# Patient Record
Sex: MALE | Race: WHITE | URBAN - METROPOLITAN AREA
[De-identification: names, ages, dates, MRNs, and addresses within clinical notes are randomized per-mention and may not be internally consistent; named-entity substitution may affect disease eponyms.]

---

## 2017-04-07 ENCOUNTER — HOSPITAL ENCOUNTER (OUTPATIENT)
Age: 52
Setting detail: SPECIMEN
Discharge: HOME | End: 2017-04-07
Payer: MEDICARE

## 2017-04-07 LAB
-: ABNORMAL
AMORPHOUS: ABNORMAL
BACTERIA: ABNORMAL
BILIRUBIN URINE: ABNORMAL
CASTS UA: ABNORMAL /LPF (ref 0–2)
COLOR: ABNORMAL
COMMENT UA: ABNORMAL
CRYSTALS, UA: ABNORMAL /HPF
EPITHELIAL CELLS UA: ABNORMAL /HPF (ref 0–5)
GLUCOSE URINE: NEGATIVE
KETONES, URINE: NEGATIVE
LEUKOCYTE ESTERASE, URINE: ABNORMAL
MUCUS: ABNORMAL
NITRITE, URINE: POSITIVE
OTHER OBSERVATIONS UA: ABNORMAL
PH UA: 6 (ref 5–8)
PROTEIN UA: ABNORMAL
RBC UA: ABNORMAL /HPF (ref 0–2)
RENAL EPITHELIAL, UA: ABNORMAL /HPF
SPECIFIC GRAVITY UA: 1.02 (ref 1–1.03)
TRICHOMONAS: ABNORMAL
TURBIDITY: ABNORMAL
URINE HGB: ABNORMAL
UROBILINOGEN, URINE: NORMAL
WBC UA: ABNORMAL /HPF (ref 0–5)
YEAST: ABNORMAL

## 2017-04-07 PROCEDURE — 87077 CULTURE AEROBIC IDENTIFY: CPT

## 2017-04-07 PROCEDURE — 87086 URINE CULTURE/COLONY COUNT: CPT

## 2017-04-07 PROCEDURE — 87186 SC STD MICRODIL/AGAR DIL: CPT

## 2017-04-07 PROCEDURE — 81001 URINALYSIS AUTO W/SCOPE: CPT

## 2017-04-11 LAB
CULTURE: ABNORMAL
Lab: ABNORMAL
ORGANISM: ABNORMAL
ORGANISM: ABNORMAL
SPECIMEN DESCRIPTION: ABNORMAL
STATUS: ABNORMAL

## 2017-04-25 ENCOUNTER — HOSPITAL ENCOUNTER (OUTPATIENT)
Age: 52
Setting detail: SPECIMEN
Discharge: HOME | End: 2017-04-25
Payer: MEDICARE

## 2017-04-25 LAB — PROSTATE SPECIFIC ANTIGEN: 1.75 UG/L

## 2017-04-25 PROCEDURE — P9603 ONE-WAY ALLOW PRORATED MILES: HCPCS

## 2017-04-25 PROCEDURE — 36415 COLL VENOUS BLD VENIPUNCTURE: CPT

## 2017-04-25 PROCEDURE — G0103 PSA SCREENING: HCPCS

## 2017-06-27 ENCOUNTER — HOSPITAL ENCOUNTER (OUTPATIENT)
Age: 52
Setting detail: SPECIMEN
Discharge: HOME | End: 2017-06-27
Payer: MEDICARE

## 2017-06-28 ENCOUNTER — HOSPITAL ENCOUNTER (OUTPATIENT)
Age: 52
Setting detail: SPECIMEN
Discharge: HOME | End: 2017-06-28
Payer: MEDICARE

## 2017-06-28 LAB
-: ABNORMAL
AMORPHOUS: ABNORMAL
BACTERIA: ABNORMAL
BILIRUBIN URINE: NEGATIVE
CASTS UA: ABNORMAL /LPF (ref 0–8)
CHOLESTEROL/HDL RATIO: 4.1
CHOLESTEROL: 195 MG/DL
COLOR: YELLOW
COMMENT UA: ABNORMAL
CRYSTALS, UA: ABNORMAL /HPF
EPITHELIAL CELLS UA: ABNORMAL /HPF (ref 0–5)
GLUCOSE URINE: NEGATIVE
HDLC SERPL-MCNC: 48 MG/DL
KETONES, URINE: ABNORMAL
LDL CHOLESTEROL: 106 MG/DL (ref 0–130)
LEUKOCYTE ESTERASE, URINE: ABNORMAL
MUCUS: ABNORMAL
NITRITE, URINE: NEGATIVE
OTHER OBSERVATIONS UA: ABNORMAL
PH UA: 5.5 (ref 5–8)
PROTEIN UA: ABNORMAL
RBC UA: ABNORMAL /HPF (ref 0–4)
RENAL EPITHELIAL, UA: ABNORMAL /HPF
SPECIFIC GRAVITY UA: 1.01 (ref 1–1.03)
TRICHOMONAS: ABNORMAL
TRIGL SERPL-MCNC: 203 MG/DL
TURBIDITY: ABNORMAL
URINE HGB: ABNORMAL
UROBILINOGEN, URINE: NORMAL
VALPROIC ACID LEVEL: 7 UG/ML (ref 50–100)
VALPROIC DATE LAST DOSE: ABNORMAL
VALPROIC DOSE AMOUNT: ABNORMAL
VALPROIC TIME LAST DOSE: ABNORMAL
VLDLC SERPL CALC-MCNC: ABNORMAL MG/DL (ref 1–30)
WBC UA: ABNORMAL /HPF (ref 0–5)
YEAST: ABNORMAL

## 2017-06-28 PROCEDURE — 81001 URINALYSIS AUTO W/SCOPE: CPT

## 2017-06-28 PROCEDURE — 87077 CULTURE AEROBIC IDENTIFY: CPT

## 2017-06-28 PROCEDURE — 80164 ASSAY DIPROPYLACETIC ACD TOT: CPT

## 2017-06-28 PROCEDURE — P9603 ONE-WAY ALLOW PRORATED MILES: HCPCS

## 2017-06-28 PROCEDURE — 80061 LIPID PANEL: CPT

## 2017-06-28 PROCEDURE — 36415 COLL VENOUS BLD VENIPUNCTURE: CPT

## 2017-06-28 PROCEDURE — 87186 SC STD MICRODIL/AGAR DIL: CPT

## 2017-06-28 PROCEDURE — 87086 URINE CULTURE/COLONY COUNT: CPT

## 2017-07-01 LAB
CULTURE: ABNORMAL
Lab: ABNORMAL
ORGANISM: ABNORMAL
ORGANISM: ABNORMAL
SPECIMEN DESCRIPTION: ABNORMAL
SPECIMEN DESCRIPTION: ABNORMAL
STATUS: ABNORMAL

## 2017-07-25 ENCOUNTER — HOSPITAL ENCOUNTER (OUTPATIENT)
Age: 52
Setting detail: SPECIMEN
Discharge: HOME | End: 2017-07-25
Payer: MEDICARE

## 2017-07-26 ENCOUNTER — HOSPITAL ENCOUNTER (OUTPATIENT)
Age: 52
Setting detail: SPECIMEN
Discharge: HOME | End: 2017-07-26
Payer: MEDICARE

## 2017-07-26 LAB
ALBUMIN SERPL-MCNC: 4.2 G/DL (ref 3.5–5.2)
ALBUMIN/GLOBULIN RATIO: 1.2 (ref 1–2.5)
ALP BLD-CCNC: 154 U/L (ref 40–129)
ALT SERPL-CCNC: 7 U/L (ref 5–41)
AST SERPL-CCNC: 13 U/L
BILIRUB SERPL-MCNC: 0.2 MG/DL (ref 0.3–1.2)
BILIRUBIN DIRECT: <0.08 MG/DL
BILIRUBIN, INDIRECT: ABNORMAL MG/DL (ref 0–1)
GLOBULIN: ABNORMAL G/DL (ref 1.5–3.8)
TOTAL PROTEIN: 7.6 G/DL (ref 6.4–8.3)
VALPROIC ACID LEVEL: 5 UG/ML (ref 50–100)
VALPROIC DATE LAST DOSE: ABNORMAL
VALPROIC DOSE AMOUNT: ABNORMAL
VALPROIC TIME LAST DOSE: ABNORMAL

## 2017-07-26 PROCEDURE — P9603 ONE-WAY ALLOW PRORATED MILES: HCPCS

## 2017-07-26 PROCEDURE — 36415 COLL VENOUS BLD VENIPUNCTURE: CPT

## 2017-07-26 PROCEDURE — 80164 ASSAY DIPROPYLACETIC ACD TOT: CPT

## 2017-07-26 PROCEDURE — 80076 HEPATIC FUNCTION PANEL: CPT

## 2017-08-17 ENCOUNTER — HOSPITAL ENCOUNTER (OUTPATIENT)
Age: 52
Setting detail: SPECIMEN
Discharge: HOME | End: 2017-08-17
Payer: MEDICARE

## 2017-08-17 LAB
-: ABNORMAL
AMORPHOUS: ABNORMAL
BACTERIA: ABNORMAL
BILIRUBIN URINE: NEGATIVE
CASTS UA: ABNORMAL /LPF (ref 0–8)
COLOR: YELLOW
COMMENT UA: ABNORMAL
CRYSTALS, UA: ABNORMAL /HPF
EPITHELIAL CELLS UA: ABNORMAL /HPF (ref 0–5)
GLUCOSE URINE: NEGATIVE
KETONES, URINE: ABNORMAL
LEUKOCYTE ESTERASE, URINE: ABNORMAL
MUCUS: ABNORMAL
NITRITE, URINE: NEGATIVE
OTHER OBSERVATIONS UA: ABNORMAL
PH UA: 5.5 (ref 5–8)
PROTEIN UA: ABNORMAL
RBC UA: ABNORMAL /HPF (ref 0–4)
RENAL EPITHELIAL, UA: ABNORMAL /HPF
SPECIFIC GRAVITY UA: 1.01 (ref 1–1.03)
TRICHOMONAS: ABNORMAL
TURBIDITY: ABNORMAL
URINE HGB: ABNORMAL
UROBILINOGEN, URINE: NORMAL
WBC UA: ABNORMAL /HPF (ref 0–5)
YEAST: ABNORMAL

## 2017-08-17 PROCEDURE — 81001 URINALYSIS AUTO W/SCOPE: CPT

## 2017-08-17 PROCEDURE — 87086 URINE CULTURE/COLONY COUNT: CPT

## 2017-08-17 PROCEDURE — 87077 CULTURE AEROBIC IDENTIFY: CPT

## 2017-08-17 PROCEDURE — 87186 SC STD MICRODIL/AGAR DIL: CPT

## 2017-08-17 PROCEDURE — 87088 URINE BACTERIA CULTURE: CPT

## 2017-08-19 LAB
CULTURE: ABNORMAL
Lab: ABNORMAL
Lab: ABNORMAL
ORGANISM: ABNORMAL
ORGANISM: ABNORMAL
SPECIMEN DESCRIPTION: ABNORMAL
SPECIMEN DESCRIPTION: ABNORMAL
STATUS: ABNORMAL

## 2017-09-15 ENCOUNTER — HOSPITAL ENCOUNTER (OUTPATIENT)
Age: 52
Setting detail: SPECIMEN
Discharge: HOME | End: 2017-09-15
Payer: MEDICARE

## 2017-09-15 LAB
-: ABNORMAL
AMORPHOUS: ABNORMAL
BACTERIA: ABNORMAL
BILIRUBIN URINE: NEGATIVE
CASTS UA: ABNORMAL /LPF (ref 0–2)
COLOR: ABNORMAL
COMMENT UA: ABNORMAL
CRYSTALS, UA: ABNORMAL /HPF
EPITHELIAL CELLS UA: ABNORMAL /HPF (ref 0–5)
GLUCOSE URINE: NEGATIVE
KETONES, URINE: NEGATIVE
LEUKOCYTE ESTERASE, URINE: ABNORMAL
MUCUS: ABNORMAL
NITRITE, URINE: NEGATIVE
OTHER OBSERVATIONS UA: ABNORMAL
PH UA: 7.5 (ref 5–8)
PROTEIN UA: ABNORMAL
RBC UA: ABNORMAL /HPF (ref 0–2)
RENAL EPITHELIAL, UA: ABNORMAL /HPF
SPECIFIC GRAVITY UA: 1.02 (ref 1–1.03)
TRICHOMONAS: ABNORMAL
TURBIDITY: ABNORMAL
URINE HGB: ABNORMAL
UROBILINOGEN, URINE: NORMAL
WBC UA: ABNORMAL /HPF (ref 0–5)
YEAST: ABNORMAL

## 2017-09-15 PROCEDURE — 87088 URINE BACTERIA CULTURE: CPT

## 2017-09-15 PROCEDURE — 81001 URINALYSIS AUTO W/SCOPE: CPT

## 2017-09-15 PROCEDURE — 87186 SC STD MICRODIL/AGAR DIL: CPT

## 2017-09-15 PROCEDURE — 87086 URINE CULTURE/COLONY COUNT: CPT

## 2017-09-18 LAB
CULTURE: ABNORMAL
CULTURE: ABNORMAL
Lab: ABNORMAL
Lab: ABNORMAL
ORGANISM: ABNORMAL
SPECIMEN DESCRIPTION: ABNORMAL
SPECIMEN DESCRIPTION: ABNORMAL
STATUS: ABNORMAL

## 2017-12-28 ENCOUNTER — HOSPITAL ENCOUNTER (OUTPATIENT)
Age: 52
Setting detail: SPECIMEN
Discharge: HOME | End: 2017-12-28
Payer: MEDICARE

## 2017-12-28 LAB
VALPROIC ACID LEVEL: 11 UG/ML (ref 50–125)
VALPROIC DATE LAST DOSE: ABNORMAL
VALPROIC DOSE AMOUNT: ABNORMAL
VALPROIC TIME LAST DOSE: ABNORMAL

## 2017-12-28 PROCEDURE — 36415 COLL VENOUS BLD VENIPUNCTURE: CPT

## 2017-12-28 PROCEDURE — P9603 ONE-WAY ALLOW PRORATED MILES: HCPCS

## 2017-12-28 PROCEDURE — 80164 ASSAY DIPROPYLACETIC ACD TOT: CPT

## 2018-01-07 ENCOUNTER — HOSPITAL ENCOUNTER (OUTPATIENT)
Age: 53
Setting detail: SPECIMEN
Discharge: HOME | End: 2018-01-07
Payer: MEDICARE

## 2018-01-08 ENCOUNTER — HOSPITAL ENCOUNTER (OUTPATIENT)
Age: 53
Setting detail: SPECIMEN
Discharge: HOME | End: 2018-01-08
Payer: MEDICARE

## 2018-01-08 LAB
DIRECT EXAM: NORMAL
Lab: NORMAL
Lab: NORMAL
SPECIMEN DESCRIPTION: NORMAL
SPECIMEN DESCRIPTION: NORMAL
STATUS: NORMAL

## 2018-01-08 PROCEDURE — 87804 INFLUENZA ASSAY W/OPTIC: CPT

## 2018-08-16 ENCOUNTER — HOSPITAL ENCOUNTER (OUTPATIENT)
Age: 53
Setting detail: SPECIMEN
Discharge: HOME | End: 2018-08-16
Payer: MEDICARE

## 2018-08-16 LAB
ALBUMIN SERPL-MCNC: 4.2 G/DL (ref 3.5–5.2)
ALBUMIN/GLOBULIN RATIO: 1.3 (ref 1–2.5)
ALP BLD-CCNC: 188 U/L (ref 40–129)
ALT SERPL-CCNC: 9 U/L (ref 5–41)
AST SERPL-CCNC: 17 U/L
BILIRUB SERPL-MCNC: 0.18 MG/DL (ref 0.3–1.2)
BILIRUBIN DIRECT: 0.08 MG/DL
BILIRUBIN, INDIRECT: 0.1 MG/DL (ref 0–1)
GLOBULIN: ABNORMAL G/DL (ref 1.5–3.8)
TOTAL PROTEIN: 7.4 G/DL (ref 6.4–8.3)
VALPROIC ACID LEVEL: 10 UG/ML (ref 50–125)
VALPROIC DATE LAST DOSE: ABNORMAL
VALPROIC DOSE AMOUNT: ABNORMAL
VALPROIC TIME LAST DOSE: ABNORMAL

## 2018-08-16 PROCEDURE — 80164 ASSAY DIPROPYLACETIC ACD TOT: CPT

## 2018-08-16 PROCEDURE — 80076 HEPATIC FUNCTION PANEL: CPT

## 2018-08-16 PROCEDURE — 36415 COLL VENOUS BLD VENIPUNCTURE: CPT

## 2018-08-16 PROCEDURE — P9603 ONE-WAY ALLOW PRORATED MILES: HCPCS

## 2018-12-19 ENCOUNTER — HOSPITAL ENCOUNTER (OUTPATIENT)
Age: 53
Setting detail: SPECIMEN
Discharge: HOME | End: 2018-12-19
Payer: MEDICARE

## 2018-12-19 LAB
CHOLESTEROL/HDL RATIO: 3.7
CHOLESTEROL: 168 MG/DL
HDLC SERPL-MCNC: 45 MG/DL
LDL CHOLESTEROL: 89 MG/DL (ref 0–130)
TRIGL SERPL-MCNC: 168 MG/DL
VALPROIC ACID LEVEL: 4 UG/ML (ref 50–125)
VALPROIC DATE LAST DOSE: ABNORMAL
VALPROIC DOSE AMOUNT: ABNORMAL
VALPROIC TIME LAST DOSE: ABNORMAL
VLDLC SERPL CALC-MCNC: ABNORMAL MG/DL (ref 1–30)

## 2018-12-19 PROCEDURE — 80164 ASSAY DIPROPYLACETIC ACD TOT: CPT

## 2018-12-19 PROCEDURE — 36415 COLL VENOUS BLD VENIPUNCTURE: CPT

## 2018-12-19 PROCEDURE — 80061 LIPID PANEL: CPT

## 2018-12-19 PROCEDURE — P9603 ONE-WAY ALLOW PRORATED MILES: HCPCS

## 2019-02-12 ENCOUNTER — HOSPITAL ENCOUNTER (OUTPATIENT)
Age: 54
Setting detail: SPECIMEN
Discharge: HOME | End: 2019-02-12
Payer: COMMERCIAL

## 2019-02-12 LAB
ALBUMIN SERPL-MCNC: 3.8 G/DL (ref 3.5–5.2)
ALBUMIN/GLOBULIN RATIO: 1.2 (ref 1–2.5)
ALP BLD-CCNC: 124 U/L (ref 40–129)
ALT SERPL-CCNC: 14 U/L (ref 5–41)
AST SERPL-CCNC: 19 U/L
BILIRUB SERPL-MCNC: 0.29 MG/DL (ref 0.3–1.2)
BILIRUBIN DIRECT: 0.15 MG/DL
BILIRUBIN, INDIRECT: 0.14 MG/DL (ref 0–1)
CHOLESTEROL/HDL RATIO: 3.4
CHOLESTEROL: 155 MG/DL
GLOBULIN: ABNORMAL G/DL (ref 1.5–3.8)
HDLC SERPL-MCNC: 45 MG/DL
LDL CHOLESTEROL: 33 MG/DL (ref 0–130)
TOTAL PROTEIN: 6.9 G/DL (ref 6.4–8.3)
TRIGL SERPL-MCNC: 384 MG/DL
VALPROIC ACID LEVEL: 3 UG/ML (ref 50–125)
VALPROIC DATE LAST DOSE: ABNORMAL
VALPROIC DOSE AMOUNT: ABNORMAL
VALPROIC TIME LAST DOSE: ABNORMAL
VLDLC SERPL CALC-MCNC: ABNORMAL MG/DL (ref 1–30)

## 2019-02-12 PROCEDURE — 80164 ASSAY DIPROPYLACETIC ACD TOT: CPT

## 2019-02-12 PROCEDURE — 36415 COLL VENOUS BLD VENIPUNCTURE: CPT

## 2019-02-12 PROCEDURE — P9603 ONE-WAY ALLOW PRORATED MILES: HCPCS

## 2019-02-12 PROCEDURE — 80076 HEPATIC FUNCTION PANEL: CPT

## 2019-02-12 PROCEDURE — 80061 LIPID PANEL: CPT

## 2019-05-08 ENCOUNTER — APPOINTMENT (OUTPATIENT)
Dept: GENERAL RADIOLOGY | Age: 54
End: 2019-05-08
Payer: MEDICARE

## 2019-05-08 ENCOUNTER — HOSPITAL ENCOUNTER (EMERGENCY)
Age: 54
Discharge: HOME | End: 2019-05-08
Attending: EMERGENCY MEDICINE
Payer: MEDICARE

## 2019-05-08 ENCOUNTER — APPOINTMENT (OUTPATIENT)
Dept: CT IMAGING | Age: 54
End: 2019-05-08
Payer: MEDICARE

## 2019-05-08 VITALS
BODY MASS INDEX: 25.71 KG/M2 | HEART RATE: 77 BPM | HEIGHT: 66 IN | RESPIRATION RATE: 16 BRPM | OXYGEN SATURATION: 93 % | TEMPERATURE: 98 F | SYSTOLIC BLOOD PRESSURE: 139 MMHG | DIASTOLIC BLOOD PRESSURE: 80 MMHG | WEIGHT: 160 LBS

## 2019-05-08 DIAGNOSIS — R41.0 TRANSIENT CONFUSION: Primary | ICD-10-CM

## 2019-05-08 LAB
ABSOLUTE EOS #: 0 K/UL (ref 0–0.4)
ABSOLUTE IMMATURE GRANULOCYTE: 0 K/UL (ref 0–0.3)
ABSOLUTE LYMPH #: 0.78 K/UL (ref 1–4.8)
ABSOLUTE MONO #: 0.31 K/UL (ref 0.2–0.8)
ANION GAP SERPL CALCULATED.3IONS-SCNC: 18 MMOL/L (ref 9–17)
BASOPHILS # BLD: 0 %
BASOPHILS ABSOLUTE: 0 K/UL (ref 0–0.2)
BUN BLDV-MCNC: 34 MG/DL (ref 6–20)
BUN/CREAT BLD: 8 (ref 9–20)
CALCIUM SERPL-MCNC: 9.2 MG/DL (ref 8.6–10.4)
CHLORIDE BLD-SCNC: 96 MMOL/L (ref 98–107)
CO2: 22 MMOL/L (ref 20–31)
CREAT SERPL-MCNC: 4.33 MG/DL (ref 0.7–1.2)
DIFFERENTIAL TYPE: ABNORMAL
EOSINOPHILS RELATIVE PERCENT: 0 % (ref 1–4)
GFR AFRICAN AMERICAN: 17 ML/MIN
GFR NON-AFRICAN AMERICAN: 14 ML/MIN
GFR SERPL CREATININE-BSD FRML MDRD: ABNORMAL ML/MIN/{1.73_M2}
GFR SERPL CREATININE-BSD FRML MDRD: ABNORMAL ML/MIN/{1.73_M2}
GLUCOSE BLD-MCNC: 89 MG/DL (ref 70–99)
HCT VFR BLD CALC: 34.7 % (ref 40.7–50.3)
HEMOGLOBIN: 11.4 G/DL (ref 13–17)
IMMATURE GRANULOCYTES: 0 %
LYMPHOCYTES # BLD: 10 % (ref 24–44)
MAGNESIUM: 2.1 MG/DL (ref 1.6–2.6)
MCH RBC QN AUTO: 32.9 PG (ref 25.2–33.5)
MCHC RBC AUTO-ENTMCNC: 32.9 G/DL (ref 28.4–34.8)
MCV RBC AUTO: 100.3 FL (ref 82.6–102.9)
MONOCYTES # BLD: 4 % (ref 1–7)
MORPHOLOGY: ABNORMAL
MORPHOLOGY: ABNORMAL
NRBC AUTOMATED: 0 PER 100 WBC
PDW BLD-RTO: 13.5 % (ref 11.8–14.4)
PHOSPHORUS: 4.8 MG/DL (ref 2.5–4.5)
PLATELET # BLD: 188 K/UL (ref 138–453)
PLATELET ESTIMATE: ABNORMAL
PMV BLD AUTO: 9.3 FL (ref 8.1–13.5)
POTASSIUM SERPL-SCNC: 4 MMOL/L (ref 3.7–5.3)
RBC # BLD: 3.46 M/UL (ref 4.21–5.77)
RBC # BLD: ABNORMAL 10*6/UL
SEG NEUTROPHILS: 86 % (ref 36–66)
SEGMENTED NEUTROPHILS ABSOLUTE COUNT: 6.71 K/UL (ref 1.8–7.7)
SODIUM BLD-SCNC: 136 MMOL/L (ref 135–144)
WBC # BLD: 7.8 K/UL (ref 3.5–11.3)
WBC # BLD: ABNORMAL 10*3/UL

## 2019-05-08 PROCEDURE — 83735 ASSAY OF MAGNESIUM: CPT

## 2019-05-08 PROCEDURE — 85025 COMPLETE CBC W/AUTO DIFF WBC: CPT

## 2019-05-08 PROCEDURE — 80048 BASIC METABOLIC PNL TOTAL CA: CPT

## 2019-05-08 PROCEDURE — 84100 ASSAY OF PHOSPHORUS: CPT

## 2019-05-08 PROCEDURE — 93005 ELECTROCARDIOGRAM TRACING: CPT

## 2019-05-08 PROCEDURE — 99285 EMERGENCY DEPT VISIT HI MDM: CPT

## 2019-05-08 SDOH — HEALTH STABILITY: MENTAL HEALTH: HOW OFTEN DO YOU HAVE A DRINK CONTAINING ALCOHOL?: NEVER

## 2019-05-08 NOTE — ED NOTES
Pt. Jules Innocent from dialysis for somewhat altered mental status. Pt. Is alert, but whispering. Denies sore throat, but wants to talk and reverts to motions. Unsure what pt's usual mental status is. Having difficulty with history and complaints.       Lana Nevarez RN  05/08/19 5210

## 2019-05-08 NOTE — ED NOTES
Pt. Not wanting additional testing, but does say he is SOB. He wants to go home and do a respiratory treatment at home. Daria Reyes, ZULEYMA, and Dr. Hailey Lake has talked to pt also.       Nicky Malagon RN  05/08/19 4258

## 2019-05-08 NOTE — ED PROVIDER NOTES
Attending Supervisory Note/Shared Visit   I have personally performed a face to face diagnostic evaluation on this patient. I have reviewed the mid-levels findings and agree.         (Please note that portions of this note were completed with a voice recognition program.  Efforts were made to edit the dictations but occasionally words are mis-transcribed.)    Jannet Duckworth MD  Attending Emergency Physician      Jannet Duckworth MD  05/08/19 3112

## 2019-05-08 NOTE — ED NOTES
Bed: 22  Expected date: 5/8/19  Expected time: 3:27 PM  Means of arrival: [facility]  Comments:  [facility]     Leon Maki  05/08/19 1532

## 2019-05-08 NOTE — ED PROVIDER NOTES
[facility] ED  EMERGENCY DEPARTMENT ENCOUNTER      Pt Name: Pedro Blanco  MRN: [id number]  [place] 1965  Date of evaluation: 5/8/2019  Provider: LIZZ Wills CNP    CHIEF COMPLAINT       Chief Complaint   Patient presents with    Altered Mental Status         HISTORY OF PRESENT ILLNESS  (Location/Symptom, Timing/Onset, Context/Setting, Quality, Duration, Modifying Factors, Severity.)   Pedro Blanco is a 46 y.o. male who presents to the emergency department via squad with altered mental status. He is sent from his dialysis unit. According to report he is usually more talkative after his treatments. He is alert on arrival.  He is opening his mouth as if he is talking whispering so quietly that I cannot hear any of his responses. He is following commands. Additional history includes schizoaffective disorder. Nursing Notes were reviewed. ALLERGIES     Penicillins    CURRENT MEDICATIONS       Discharge Medication List as of 5/8/2019  4:29 PM      CONTINUE these medications which have NOT CHANGED    Details   iloperidone (FANAPT) 4 MG TABS tablet Take 1 tablet by mouth 2 times daily, Disp-60 tablet, R-1Normal      PARoxetine (PAXIL) 20 MG tablet Take 1 tablet by mouth every morning, Disp-30 tablet, R-3Normal      atorvastatin (LIPITOR) 20 MG tablet Take 20 mg by mouth dailyHistorical Med      pantoprazole (PROTONIX) 40 MG tablet Take 40 mg by mouth dailyHistorical Med      predniSONE (DELTASONE) 20 MG tablet Take 20 mg by mouth 2 times dailyHistorical Med      budesonide-formoterol (SYMBICORT) 160-4.5 MCG/ACT AERO Inhale 2 puffs into the lungs every 12 hours Rinse mouth after use. Historical Med      LORazepam (ATIVAN) 0.5 MG tablet Take 1 tablet by mouth every 6 hours as needed for Anxiety, Disp-60 tablet, R-0Print      bumetanide (BUMEX) 2 MG tablet Take 2 mg by mouth daily      divalproex (DEPAKOTE SPRINKLE) 125 MG capsule Take 125 mg by mouth 2 times daily      diphenhydrAMINE (BENADRYL) 25 MG tablet Take 25 mg by mouth every 6 hours as needed for Allergies      calcium acetate (PHOSLO) 667 MG capsule Take by mouth 2 times daily (with meals)      Omega-3 Fatty Acids (FISH OIL) 1000 MG CAPS Take 2,000 mg by mouth 3 times daily      gemfibrozil (LOPID) 600 MG tablet Take 600 mg by mouth 2 times daily (before meals)      polyvinyl alcohol (LIQUIFILM TEARS) 1.4 % ophthalmic solution Place 1 drop into both eyes 2 times daily      lisinopril (PRINIVIL;ZESTRIL) 10 MG tablet Take 10 mg by mouth nightly      metoprolol (TOPROL XL) 25 MG XL tablet Take 25 mg by mouth daily      omeprazole (PRILOSEC) 20 MG capsule Take 20 mg by mouth 2 times daily      QUEtiapine (SEROQUEL) 300 MG tablet Take 600 mg by mouth 3 times daily       NONFORMULARY Fluid restriction 1500ml/day      Vitamin D (CHOLECALCIFEROL) 1000 UNITS CAPS capsule Take 2,000 Units by mouth daily      albuterol (PROVENTIL) (2.5 MG/3ML) 0.083% nebulizer solution Take 2.5 mg by nebulization every 6 hours as needed for Shortness of Breath      haloperidol (HALDOL) 2 MG tablet Take 4 mg by mouth every 6 hours as needed      HYDROcodone-acetaminophen (NORCO) 5-325 MG per tablet Take 1 tablet by mouth every 8 hours as needed for Pain      albuterol (PROVENTIL HFA;VENTOLIN HFA) 108 (90 BASE) MCG/ACT inhaler Inhale 2 puffs into the lungs every 2 hours as needed for Shortness of Breath      tolnaftate (TINACTIN) 1 % cream Apply topically 2 times daily Apply topically 2 times daily. To abd folds, Topical, Historical Med      hydrocortisone 1 % cream Apply topically daily Right anterior FA, Apply topically daily. , Topical, Historical Med      Menthol (HALLS COUGH DROPS MT) Take by mouth as needed (cough)             PAST MEDICAL HISTORY         Diagnosis Date    Anemia in chronic kidney disease (CKD)     Chronic kidney disease     dialysis pt    [facility]     Depression     Diabetes mellitus [facility]     [facility] disease) [facility]     Hyperpotassemia     Hypertension     MRSA (methicillin resistant staph aureus) culture positive 04/05/2011    thigh    Schizoaffective disorder ([facility]        SURGICAL HISTORY           Procedure Laterality Date    AV FISTULA REPAIR           FAMILY HISTORY     History reviewed. No pertinent family history. No family status information on file. SOCIAL HISTORY      reports that he has been smoking cigarettes. He has never used smokeless tobacco. He reports that he drank alcohol. REVIEW OF SYSTEMS    (2-9 systems for level 4, 10 or more for level 5)     Review of Systems   Unable to perform ROS: Other        Except as noted above the remainder of the review of systems was reviewed and negative. PHYSICAL EXAM    (up to 7 for level 4, 8 or more for level 5)     Vitals:    05/08/19 1538   BP: 139/80   Pulse: 77   Resp: 16   Temp: 98 °F (36.7 °C)   TempSrc: Oral   SpO2: 93%   Weight: 160 lb (72.6 kg)   Height: 5' 6\" (1.676 m)         *Physical Exam   Constitutional: He is oriented to person, place, and time. He appears well-developed and well-nourished. No distress. HENT:   Mouth/Throat: Oropharynx is clear and moist. No oropharyngeal exudate. Eyes: Pupils are equal, round, and reactive to light. Conjunctivae and EOM are normal.   Cardiovascular: Normal rate and regular rhythm. Pulmonary/Chest: Effort normal and breath sounds normal. No respiratory distress. Abdominal: Soft. Bowel sounds are normal. He exhibits no distension. There is no tenderness. Neurological: He is alert and oriented to person, place, and time. Skin: Skin is warm and dry.            DIAGNOSTIC RESULTS     EKG: All EKG's are interpreted by the Emergency Department Physician who either signs or Co-signs this chart in the absence of a cardiologist.    EKG was interpreted by Dr. Bud Tellez:   Non-plain film images such as CT, Ultrasound and MRI are read by the radiologist. Ely Quiles DISPOSITION [place] 05/08/2019 04:29:01 PM      PATIENT REFERRED TO:   Taryn Olivera DO  [address]  [address] [facility]  [phone]    Call in 1 day        DISCHARGE MEDICATIONS:     Discharge Medication List as of 5/8/2019  4:29 PM              (Please note that portions of this note were completed with a voice recognition program.  Efforts were made to edit the dictations but occasionally words are mis-transcribed. )    LIZZ PORTER - LIZZ Barrios CNP  05/09/19 8632

## 2019-05-09 LAB
EKG ATRIAL RATE: 75 BPM
EKG P AXIS: 33 DEGREES
EKG P-R INTERVAL: 150 MS
EKG Q-T INTERVAL: 410 MS
EKG QRS DURATION: 88 MS
EKG QTC CALCULATION (BAZETT): 457 MS
EKG R AXIS: 29 DEGREES
EKG T AXIS: 52 DEGREES
EKG VENTRICULAR RATE: 75 BPM

## 2019-06-10 ENCOUNTER — HOSPITAL ENCOUNTER (OUTPATIENT)
Age: 54
Setting detail: SPECIMEN
Discharge: HOME | End: 2019-06-10
Payer: MEDICARE

## 2019-06-10 LAB — POTASSIUM SERPL-SCNC: 5.4 MMOL/L (ref 3.7–5.3)

## 2019-06-10 PROCEDURE — 84132 ASSAY OF SERUM POTASSIUM: CPT

## 2019-06-10 PROCEDURE — P9603 ONE-WAY ALLOW PRORATED MILES: HCPCS

## 2019-06-10 PROCEDURE — 36415 COLL VENOUS BLD VENIPUNCTURE: CPT

## 2019-06-18 ENCOUNTER — HOSPITAL ENCOUNTER (OUTPATIENT)
Age: 54
Setting detail: SPECIMEN
Discharge: HOME | End: 2019-06-18
Payer: MEDICARE

## 2019-06-18 LAB
VALPROIC ACID LEVEL: <3 UG/ML (ref 50–125)
VALPROIC DATE LAST DOSE: ABNORMAL
VALPROIC DOSE AMOUNT: ABNORMAL
VALPROIC TIME LAST DOSE: ABNORMAL

## 2019-06-18 PROCEDURE — 80164 ASSAY DIPROPYLACETIC ACD TOT: CPT

## 2019-06-18 PROCEDURE — 36415 COLL VENOUS BLD VENIPUNCTURE: CPT

## 2019-06-18 PROCEDURE — P9603 ONE-WAY ALLOW PRORATED MILES: HCPCS

## 2019-10-25 ENCOUNTER — HOSPITAL ENCOUNTER (OUTPATIENT)
Age: 54
Setting detail: SPECIMEN
Discharge: HOME | End: 2019-10-25
Payer: MEDICARE

## 2019-10-25 LAB
FOLATE: 8.6 NG/ML
HCT VFR BLD CALC: 37.8 % (ref 40.7–50.3)
HEMOGLOBIN: 11.8 G/DL (ref 13–17)
MAGNESIUM: 2.5 MG/DL (ref 1.6–2.6)
MCH RBC QN AUTO: 33.5 PG (ref 25.2–33.5)
MCHC RBC AUTO-ENTMCNC: 31.2 G/DL (ref 28.4–34.8)
MCV RBC AUTO: 107.4 FL (ref 82.6–102.9)
NRBC AUTOMATED: 0 PER 100 WBC
PDW BLD-RTO: 12.8 % (ref 11.8–14.4)
PLATELET # BLD: 179 K/UL (ref 138–453)
PMV BLD AUTO: 9.2 FL (ref 8.1–13.5)
RBC # BLD: 3.52 M/UL (ref 4.21–5.77)
VITAMIN B-12: 525 PG/ML (ref 232–1245)
VITAMIN D 25-HYDROXY: 18 NG/ML (ref 30–100)
WBC # BLD: 3.8 K/UL (ref 3.5–11.3)

## 2019-10-25 PROCEDURE — 83735 ASSAY OF MAGNESIUM: CPT

## 2019-10-25 PROCEDURE — 82746 ASSAY OF FOLIC ACID SERUM: CPT

## 2019-10-25 PROCEDURE — 36415 COLL VENOUS BLD VENIPUNCTURE: CPT

## 2019-10-25 PROCEDURE — P9603 ONE-WAY ALLOW PRORATED MILES: HCPCS

## 2019-10-25 PROCEDURE — 82306 VITAMIN D 25 HYDROXY: CPT

## 2019-10-25 PROCEDURE — 82607 VITAMIN B-12: CPT

## 2019-10-25 PROCEDURE — 85027 COMPLETE CBC AUTOMATED: CPT

## 2019-11-25 ENCOUNTER — HOSPITAL ENCOUNTER (OUTPATIENT)
Dept: MEDSURG UNIT | Age: 54
Discharge: NURSING, REHAB OR LONG-TERM CARE | End: 2019-12-23
Attending: INTERNAL MEDICINE | Admitting: INTERNAL MEDICINE

## 2019-11-26 LAB
HBV SURFACE AB TITR SER: 60.75 MIU/ML
HEPATITIS B CORE TOTAL ANTIBODY: NONREACTIVE
HEPATITIS B SURFACE ANTIGEN: NONREACTIVE

## 2019-11-26 PROCEDURE — 86704 HEP B CORE ANTIBODY TOTAL: CPT

## 2019-11-26 PROCEDURE — 87340 HEPATITIS B SURFACE AG IA: CPT

## 2019-11-26 PROCEDURE — 86317 IMMUNOASSAY INFECTIOUS AGENT: CPT

## 2019-12-04 LAB
ABSOLUTE EOS #: 0.13 K/UL (ref 0–0.44)
ABSOLUTE IMMATURE GRANULOCYTE: 0.32 K/UL (ref 0–0.3)
ABSOLUTE LYMPH #: 0.82 K/UL (ref 1.1–3.7)
ABSOLUTE MONO #: 0.76 K/UL (ref 0.1–1.2)
BASOPHILS # BLD: 0 % (ref 0–2)
BASOPHILS ABSOLUTE: 0 K/UL (ref 0–0.2)
DIFFERENTIAL TYPE: ABNORMAL
EOSINOPHILS RELATIVE PERCENT: 2 % (ref 1–4)
HCT VFR BLD CALC: 21.2 % (ref 40.7–50.3)
HEMOGLOBIN: 6.7 G/DL (ref 13–17)
IMMATURE GRANULOCYTES: 5 %
LYMPHOCYTES # BLD: 13 % (ref 24–43)
MCH RBC QN AUTO: 33.2 PG (ref 25.2–33.5)
MCHC RBC AUTO-ENTMCNC: 31.6 G/DL (ref 28–38)
MCV RBC AUTO: 105 FL (ref 82.6–102.9)
MONOCYTES # BLD: 12 % (ref 3–12)
MORPHOLOGY: ABNORMAL
MORPHOLOGY: ABNORMAL
NRBC AUTOMATED: ABNORMAL PER 100 WBC
PDW BLD-RTO: 12.9 % (ref 11.8–14.4)
PLATELET # BLD: 215 K/UL (ref 138–453)
PLATELET ESTIMATE: ABNORMAL
PMV BLD AUTO: 9.3 FL (ref 8.1–13.5)
RBC # BLD: 2.02 M/UL (ref 4.21–5.77)
RBC # BLD: ABNORMAL 10*6/UL
SEG NEUTROPHILS: 68 % (ref 36–65)
SEGMENTED NEUTROPHILS ABSOLUTE COUNT: 4.27 K/UL (ref 1.5–8.1)
WBC # BLD: 6.3 K/UL (ref 3.5–11.3)
WBC # BLD: ABNORMAL 10*3/UL

## 2019-12-04 PROCEDURE — P9016 RBC LEUKOCYTES REDUCED: HCPCS

## 2019-12-04 PROCEDURE — 85025 COMPLETE CBC W/AUTO DIFF WBC: CPT

## 2019-12-04 PROCEDURE — 86900 BLOOD TYPING SEROLOGIC ABO: CPT

## 2019-12-04 PROCEDURE — 86850 RBC ANTIBODY SCREEN: CPT

## 2019-12-04 PROCEDURE — 86920 COMPATIBILITY TEST SPIN: CPT

## 2019-12-04 PROCEDURE — 86901 BLOOD TYPING SEROLOGIC RH(D): CPT

## 2019-12-05 LAB
FERRITIN: 1572 UG/L (ref 30–400)
IRON SATURATION: 22 % (ref 20–55)
IRON: 50 UG/DL (ref 59–158)
TOTAL IRON BINDING CAPACITY: 227 UG/DL (ref 250–450)
TRANSFERRIN: 169 MG/DL (ref 200–360)
UNSATURATED IRON BINDING CAPACITY: 177 UG/DL (ref 112–347)

## 2019-12-05 PROCEDURE — 84466 ASSAY OF TRANSFERRIN: CPT

## 2019-12-05 PROCEDURE — 83540 ASSAY OF IRON: CPT

## 2019-12-05 PROCEDURE — 83550 IRON BINDING TEST: CPT

## 2019-12-05 PROCEDURE — 82746 ASSAY OF FOLIC ACID SERUM: CPT

## 2019-12-05 PROCEDURE — 82728 ASSAY OF FERRITIN: CPT

## 2019-12-05 PROCEDURE — 82607 VITAMIN B-12: CPT

## 2019-12-06 LAB
FOLATE: 13.5 NG/ML
VITAMIN B-12: 927 PG/ML (ref 232–1245)

## 2019-12-09 LAB
ABO/RH: NORMAL
ANTIBODY SCREEN: NEGATIVE
ARM BAND NUMBER: NORMAL
BLD PROD TYP BPU: NORMAL
BLD PROD TYP BPU: NORMAL
CROSSMATCH RESULT: NORMAL
CROSSMATCH RESULT: NORMAL
DISPENSE STATUS BLOOD BANK: NORMAL
DISPENSE STATUS BLOOD BANK: NORMAL
EXPIRATION DATE: NORMAL
TRANSFUSION STATUS: NORMAL
TRANSFUSION STATUS: NORMAL
UNIT DIVISION: 0
UNIT DIVISION: 0
UNIT NUMBER: NORMAL
UNIT NUMBER: NORMAL

## 2019-12-15 LAB — AMMONIA: 28 UMOL/L (ref 16–60)

## 2019-12-15 PROCEDURE — 82140 ASSAY OF AMMONIA: CPT

## 2020-09-01 ENCOUNTER — HOSPITAL ENCOUNTER (INPATIENT)
Age: 55
LOS: 7 days | Discharge: NURSING, REHAB OR LONG-TERM CARE | DRG: 885 | End: 2020-09-08
Attending: PSYCHIATRY & NEUROLOGY | Admitting: PSYCHIATRY & NEUROLOGY
Payer: MEDICARE

## 2020-09-01 PROCEDURE — 1240000000 HC EMOTIONAL WELLNESS R&B

## 2020-09-01 RX ORDER — POLYETHYLENE GLYCOL 3350 17 G/17G
17 POWDER, FOR SOLUTION ORAL DAILY PRN
Status: DISCONTINUED | OUTPATIENT
Start: 2020-09-01 | End: 2020-09-08 | Stop reason: HOSPADM

## 2020-09-01 RX ORDER — ACETAMINOPHEN 325 MG/1
650 TABLET ORAL EVERY 4 HOURS PRN
Status: DISCONTINUED | OUTPATIENT
Start: 2020-09-01 | End: 2020-09-08 | Stop reason: HOSPADM

## 2020-09-01 RX ORDER — LORAZEPAM 1 MG/1
2 TABLET ORAL EVERY 4 HOURS PRN
Status: DISCONTINUED | OUTPATIENT
Start: 2020-09-01 | End: 2020-09-08 | Stop reason: HOSPADM

## 2020-09-01 RX ORDER — HALOPERIDOL 5 MG/ML
5 INJECTION INTRAMUSCULAR EVERY 4 HOURS PRN
Status: DISCONTINUED | OUTPATIENT
Start: 2020-09-01 | End: 2020-09-08 | Stop reason: HOSPADM

## 2020-09-01 RX ORDER — TRAZODONE HYDROCHLORIDE 50 MG/1
50 TABLET ORAL NIGHTLY PRN
Status: DISCONTINUED | OUTPATIENT
Start: 2020-09-02 | End: 2020-09-08 | Stop reason: HOSPADM

## 2020-09-01 RX ORDER — HYDROXYZINE 50 MG/1
50 TABLET, FILM COATED ORAL 3 TIMES DAILY PRN
Status: DISCONTINUED | OUTPATIENT
Start: 2020-09-01 | End: 2020-09-08 | Stop reason: HOSPADM

## 2020-09-01 RX ORDER — HALOPERIDOL 10 MG/1
5 TABLET ORAL EVERY 4 HOURS PRN
Status: DISCONTINUED | OUTPATIENT
Start: 2020-09-01 | End: 2020-09-08 | Stop reason: HOSPADM

## 2020-09-01 RX ORDER — MAGNESIUM HYDROXIDE/ALUMINUM HYDROXICE/SIMETHICONE 120; 1200; 1200 MG/30ML; MG/30ML; MG/30ML
30 SUSPENSION ORAL EVERY 6 HOURS PRN
Status: DISCONTINUED | OUTPATIENT
Start: 2020-09-01 | End: 2020-09-04

## 2020-09-01 RX ORDER — LORAZEPAM 1 MG/1
2 TABLET ORAL EVERY 4 HOURS PRN
Status: DISCONTINUED | OUTPATIENT
Start: 2020-09-01 | End: 2020-09-01

## 2020-09-01 RX ORDER — IBUPROFEN 400 MG/1
400 TABLET ORAL EVERY 6 HOURS PRN
Status: DISCONTINUED | OUTPATIENT
Start: 2020-09-01 | End: 2020-09-04

## 2020-09-01 RX ORDER — DIPHENHYDRAMINE HYDROCHLORIDE 50 MG/ML
50 INJECTION INTRAMUSCULAR; INTRAVENOUS EVERY 4 HOURS PRN
Status: DISCONTINUED | OUTPATIENT
Start: 2020-09-01 | End: 2020-09-08 | Stop reason: HOSPADM

## 2020-09-01 RX ORDER — LORAZEPAM 2 MG/ML
2 INJECTION INTRAMUSCULAR EVERY 4 HOURS PRN
Status: DISCONTINUED | OUTPATIENT
Start: 2020-09-01 | End: 2020-09-08 | Stop reason: HOSPADM

## 2020-09-02 LAB
ABSOLUTE EOS #: 0.2 K/UL (ref 0–0.4)
ABSOLUTE IMMATURE GRANULOCYTE: ABNORMAL K/UL (ref 0–0.3)
ABSOLUTE LYMPH #: 0.7 K/UL (ref 1–4.8)
ABSOLUTE MONO #: 0.4 K/UL (ref 0.1–1.3)
ANION GAP SERPL CALCULATED.3IONS-SCNC: 13 MMOL/L (ref 9–17)
BASOPHILS # BLD: 1 % (ref 0–2)
BASOPHILS ABSOLUTE: 0 K/UL (ref 0–0.2)
BUN BLDV-MCNC: 34 MG/DL (ref 6–20)
BUN/CREAT BLD: ABNORMAL (ref 9–20)
CALCIUM SERPL-MCNC: 8.6 MG/DL (ref 8.6–10.4)
CHLORIDE BLD-SCNC: 90 MMOL/L (ref 98–107)
CO2: 31 MMOL/L (ref 20–31)
CREAT SERPL-MCNC: 5.17 MG/DL (ref 0.7–1.2)
DIFFERENTIAL TYPE: ABNORMAL
EOSINOPHILS RELATIVE PERCENT: 2 % (ref 0–4)
GFR AFRICAN AMERICAN: 14 ML/MIN
GFR NON-AFRICAN AMERICAN: 12 ML/MIN
GFR SERPL CREATININE-BSD FRML MDRD: ABNORMAL ML/MIN/{1.73_M2}
GFR SERPL CREATININE-BSD FRML MDRD: ABNORMAL ML/MIN/{1.73_M2}
GLUCOSE BLD-MCNC: 184 MG/DL (ref 70–99)
GLUCOSE BLD-MCNC: 73 MG/DL (ref 75–110)
GLUCOSE BLD-MCNC: 73 MG/DL (ref 75–110)
GLUCOSE BLD-MCNC: 98 MG/DL (ref 75–110)
HCT VFR BLD CALC: 36.7 % (ref 41–53)
HEMOGLOBIN: 12.2 G/DL (ref 13.5–17.5)
IMMATURE GRANULOCYTES: ABNORMAL %
LYMPHOCYTES # BLD: 10 % (ref 24–44)
MCH RBC QN AUTO: 33.7 PG (ref 26–34)
MCHC RBC AUTO-ENTMCNC: 33.3 G/DL (ref 31–37)
MCV RBC AUTO: 101.1 FL (ref 80–100)
MONOCYTES # BLD: 6 % (ref 1–7)
NRBC AUTOMATED: ABNORMAL PER 100 WBC
PDW BLD-RTO: 14.3 % (ref 11.5–14.9)
PHOSPHORUS: 3.9 MG/DL (ref 2.5–4.5)
PLATELET # BLD: 176 K/UL (ref 150–450)
PLATELET ESTIMATE: ABNORMAL
PMV BLD AUTO: 7.1 FL (ref 6–12)
POTASSIUM SERPL-SCNC: 4.1 MMOL/L (ref 3.7–5.3)
RBC # BLD: 3.63 M/UL (ref 4.5–5.9)
RBC # BLD: ABNORMAL 10*6/UL
SEG NEUTROPHILS: 81 % (ref 36–66)
SEGMENTED NEUTROPHILS ABSOLUTE COUNT: 5.9 K/UL (ref 1.3–9.1)
SODIUM BLD-SCNC: 134 MMOL/L (ref 135–144)
WBC # BLD: 7.2 K/UL (ref 3.5–11)
WBC # BLD: ABNORMAL 10*3/UL

## 2020-09-02 PROCEDURE — 85025 COMPLETE CBC W/AUTO DIFF WBC: CPT

## 2020-09-02 PROCEDURE — 80048 BASIC METABOLIC PNL TOTAL CA: CPT

## 2020-09-02 PROCEDURE — 1240000000 HC EMOTIONAL WELLNESS R&B

## 2020-09-02 PROCEDURE — 90935 HEMODIALYSIS ONE EVALUATION: CPT

## 2020-09-02 PROCEDURE — 99223 1ST HOSP IP/OBS HIGH 75: CPT | Performed by: PSYCHIATRY & NEUROLOGY

## 2020-09-02 PROCEDURE — 94761 N-INVAS EAR/PLS OXIMETRY MLT: CPT

## 2020-09-02 PROCEDURE — 6370000000 HC RX 637 (ALT 250 FOR IP): Performed by: PSYCHIATRY & NEUROLOGY

## 2020-09-02 PROCEDURE — 82947 ASSAY GLUCOSE BLOOD QUANT: CPT

## 2020-09-02 PROCEDURE — 5A1D70Z PERFORMANCE OF URINARY FILTRATION, INTERMITTENT, LESS THAN 6 HOURS PER DAY: ICD-10-PCS | Performed by: PSYCHIATRY & NEUROLOGY

## 2020-09-02 PROCEDURE — 6370000000 HC RX 637 (ALT 250 FOR IP): Performed by: NURSE PRACTITIONER

## 2020-09-02 PROCEDURE — 97165 OT EVAL LOW COMPLEX 30 MIN: CPT

## 2020-09-02 PROCEDURE — 97161 PT EVAL LOW COMPLEX 20 MIN: CPT

## 2020-09-02 PROCEDURE — 99223 1ST HOSP IP/OBS HIGH 75: CPT | Performed by: INTERNAL MEDICINE

## 2020-09-02 PROCEDURE — 36415 COLL VENOUS BLD VENIPUNCTURE: CPT

## 2020-09-02 PROCEDURE — 2700000000 HC OXYGEN THERAPY PER DAY

## 2020-09-02 PROCEDURE — 84100 ASSAY OF PHOSPHORUS: CPT

## 2020-09-02 PROCEDURE — 6370000000 HC RX 637 (ALT 250 FOR IP): Performed by: INTERNAL MEDICINE

## 2020-09-02 RX ORDER — CARVEDILOL 25 MG/1
25 TABLET ORAL 2 TIMES DAILY
Status: ON HOLD | COMMUNITY
End: 2020-09-08 | Stop reason: HOSPADM

## 2020-09-02 RX ORDER — DIPHENHYDRAMINE HCL 25 MG
25 TABLET ORAL EVERY 6 HOURS PRN
Status: DISCONTINUED | OUTPATIENT
Start: 2020-09-02 | End: 2020-09-08 | Stop reason: HOSPADM

## 2020-09-02 RX ORDER — ALLOPURINOL 100 MG/1
100 TABLET ORAL DAILY
Status: DISCONTINUED | OUTPATIENT
Start: 2020-09-02 | End: 2020-09-08 | Stop reason: HOSPADM

## 2020-09-02 RX ORDER — SEVELAMER CARBONATE 800 MG/1
5 TABLET, FILM COATED ORAL
COMMUNITY

## 2020-09-02 RX ORDER — MONTELUKAST SODIUM 10 MG/1
10 TABLET ORAL NIGHTLY
Status: DISCONTINUED | OUTPATIENT
Start: 2020-09-02 | End: 2020-09-08 | Stop reason: HOSPADM

## 2020-09-02 RX ORDER — TAMSULOSIN HYDROCHLORIDE 0.4 MG/1
0.4 CAPSULE ORAL DAILY
COMMUNITY

## 2020-09-02 RX ORDER — AMLODIPINE BESYLATE 10 MG/1
10 TABLET ORAL NIGHTLY
COMMUNITY

## 2020-09-02 RX ORDER — IPRATROPIUM BROMIDE AND ALBUTEROL SULFATE 2.5; .5 MG/3ML; MG/3ML
1 SOLUTION RESPIRATORY (INHALATION)
Status: DISCONTINUED | OUTPATIENT
Start: 2020-09-02 | End: 2020-09-08 | Stop reason: HOSPADM

## 2020-09-02 RX ORDER — MONTELUKAST SODIUM 10 MG/1
10 TABLET ORAL NIGHTLY
COMMUNITY

## 2020-09-02 RX ORDER — CHOLECALCIFEROL (VITAMIN D3) 125 MCG
500 CAPSULE ORAL DAILY
Status: ON HOLD | COMMUNITY
End: 2020-09-08 | Stop reason: HOSPADM

## 2020-09-02 RX ORDER — AMLODIPINE BESYLATE 10 MG/1
10 TABLET ORAL NIGHTLY
Status: DISCONTINUED | OUTPATIENT
Start: 2020-09-02 | End: 2020-09-08 | Stop reason: HOSPADM

## 2020-09-02 RX ORDER — MAGNESIUM OXIDE 400 MG/1
400 TABLET ORAL DAILY
Status: ON HOLD | COMMUNITY
End: 2020-09-08 | Stop reason: HOSPADM

## 2020-09-02 RX ORDER — LIDOCAINE 4 G/G
1 PATCH TOPICAL PRN
COMMUNITY

## 2020-09-02 RX ORDER — ARIPIPRAZOLE 5 MG/1
2.5 TABLET ORAL DAILY
Status: ON HOLD | COMMUNITY
End: 2020-09-08 | Stop reason: HOSPADM

## 2020-09-02 RX ORDER — ALBUTEROL SULFATE 2.5 MG/3ML
2.5 SOLUTION RESPIRATORY (INHALATION) EVERY 6 HOURS PRN
Status: DISCONTINUED | OUTPATIENT
Start: 2020-09-02 | End: 2020-09-08 | Stop reason: HOSPADM

## 2020-09-02 RX ORDER — ESCITALOPRAM OXALATE 5 MG/1
5 TABLET ORAL DAILY
Status: ON HOLD | COMMUNITY
End: 2020-09-08 | Stop reason: HOSPADM

## 2020-09-02 RX ORDER — NICOTINE POLACRILEX 4 MG
15 LOZENGE BUCCAL PRN
Status: DISCONTINUED | OUTPATIENT
Start: 2020-09-02 | End: 2020-09-08 | Stop reason: HOSPADM

## 2020-09-02 RX ORDER — CALCITRIOL 0.25 UG/1
0.25 CAPSULE, LIQUID FILLED ORAL
Status: DISCONTINUED | OUTPATIENT
Start: 2020-09-02 | End: 2020-09-08 | Stop reason: HOSPADM

## 2020-09-02 RX ORDER — TAMSULOSIN HYDROCHLORIDE 0.4 MG/1
0.4 CAPSULE ORAL DAILY
Status: DISCONTINUED | OUTPATIENT
Start: 2020-09-02 | End: 2020-09-08 | Stop reason: HOSPADM

## 2020-09-02 RX ORDER — CALCITRIOL 0.25 UG/1
0.25 CAPSULE, LIQUID FILLED ORAL
COMMUNITY

## 2020-09-02 RX ORDER — DEXTROSE MONOHYDRATE 50 MG/ML
100 INJECTION, SOLUTION INTRAVENOUS PRN
Status: DISCONTINUED | OUTPATIENT
Start: 2020-09-02 | End: 2020-09-08 | Stop reason: HOSPADM

## 2020-09-02 RX ORDER — FOLIC ACID 1 MG/1
1 TABLET ORAL DAILY
Status: ON HOLD | COMMUNITY
End: 2020-09-08 | Stop reason: HOSPADM

## 2020-09-02 RX ORDER — QUETIAPINE FUMARATE 200 MG/1
400 TABLET, FILM COATED ORAL 2 TIMES DAILY
Status: ON HOLD | COMMUNITY
End: 2020-09-08 | Stop reason: HOSPADM

## 2020-09-02 RX ORDER — PATIROMER 8.4 G/1
25.2 POWDER, FOR SUSPENSION ORAL DAILY
Status: ON HOLD | COMMUNITY
End: 2020-09-02 | Stop reason: ALTCHOICE

## 2020-09-02 RX ORDER — IPRATROPIUM BROMIDE AND ALBUTEROL SULFATE 2.5; .5 MG/3ML; MG/3ML
1 SOLUTION RESPIRATORY (INHALATION) EVERY 6 HOURS
COMMUNITY

## 2020-09-02 RX ORDER — BUDESONIDE AND FORMOTEROL FUMARATE DIHYDRATE 160; 4.5 UG/1; UG/1
2 AEROSOL RESPIRATORY (INHALATION) EVERY 12 HOURS
Status: DISCONTINUED | OUTPATIENT
Start: 2020-09-02 | End: 2020-09-08 | Stop reason: HOSPADM

## 2020-09-02 RX ORDER — VALPROIC ACID 250 MG/5ML
250 SOLUTION ORAL 2 TIMES DAILY
Status: ON HOLD | COMMUNITY
End: 2020-09-08 | Stop reason: HOSPADM

## 2020-09-02 RX ORDER — IPRATROPIUM BROMIDE 42 UG/1
2 SPRAY, METERED NASAL 4 TIMES DAILY
Status: ON HOLD | COMMUNITY
End: 2020-09-02

## 2020-09-02 RX ORDER — SENNA PLUS 8.6 MG/1
1 TABLET ORAL DAILY
Status: ON HOLD | COMMUNITY
End: 2020-09-08 | Stop reason: HOSPADM

## 2020-09-02 RX ORDER — ACETAMINOPHEN 160 MG
TABLET,DISINTEGRATING ORAL
Status: ON HOLD | COMMUNITY
End: 2020-09-02 | Stop reason: SDUPTHER

## 2020-09-02 RX ORDER — POLYETHYLENE GLYCOL 3350 17 G/17G
17 POWDER, FOR SOLUTION ORAL DAILY PRN
COMMUNITY

## 2020-09-02 RX ORDER — MIDODRINE HYDROCHLORIDE 5 MG/1
5 TABLET ORAL PRN
Status: ON HOLD | COMMUNITY
End: 2020-09-08 | Stop reason: HOSPADM

## 2020-09-02 RX ORDER — BISACODYL 10 MG
10 SUPPOSITORY, RECTAL RECTAL DAILY PRN
Status: ON HOLD | COMMUNITY
End: 2020-09-08 | Stop reason: HOSPADM

## 2020-09-02 RX ORDER — LACTULOSE 10 G/15ML
10 SOLUTION ORAL DAILY
Status: ON HOLD | COMMUNITY
End: 2020-09-08 | Stop reason: HOSPADM

## 2020-09-02 RX ORDER — DIPHENHYDRAMINE HCL 25 MG
25 TABLET ORAL EVERY 6 HOURS PRN
Status: DISCONTINUED | OUTPATIENT
Start: 2020-09-02 | End: 2020-09-02

## 2020-09-02 RX ORDER — ALLOPURINOL 100 MG/1
100 TABLET ORAL DAILY
COMMUNITY

## 2020-09-02 RX ORDER — SEVELAMER CARBONATE 800 MG/1
4000 TABLET, FILM COATED ORAL
Status: DISCONTINUED | OUTPATIENT
Start: 2020-09-02 | End: 2020-09-08 | Stop reason: HOSPADM

## 2020-09-02 RX ORDER — DEXTROSE MONOHYDRATE 25 G/50ML
12.5 INJECTION, SOLUTION INTRAVENOUS PRN
Status: DISCONTINUED | OUTPATIENT
Start: 2020-09-02 | End: 2020-09-08 | Stop reason: HOSPADM

## 2020-09-02 RX ORDER — HYDROXYZINE HYDROCHLORIDE 25 MG/1
25 TABLET, FILM COATED ORAL 2 TIMES DAILY PRN
Status: ON HOLD | COMMUNITY
End: 2020-09-08 | Stop reason: HOSPADM

## 2020-09-02 RX ADMIN — DIPHENHYDRAMINE HCL 25 MG: 25 TABLET ORAL at 04:09

## 2020-09-02 RX ADMIN — BUDESONIDE AND FORMOTEROL FUMARATE DIHYDRATE 2 PUFF: 160; 4.5 AEROSOL RESPIRATORY (INHALATION) at 22:18

## 2020-09-02 RX ADMIN — MONTELUKAST 10 MG: 10 TABLET, FILM COATED ORAL at 22:18

## 2020-09-02 RX ADMIN — DIPHENHYDRAMINE HCL 25 MG: 25 TABLET ORAL at 22:18

## 2020-09-02 RX ADMIN — HYDROXYZINE HYDROCHLORIDE 50 MG: 50 TABLET, FILM COATED ORAL at 09:37

## 2020-09-02 RX ADMIN — Medication 1 LOZENGE: at 04:09

## 2020-09-02 RX ADMIN — AMLODIPINE BESYLATE 10 MG: 10 TABLET ORAL at 22:18

## 2020-09-02 ASSESSMENT — LIFESTYLE VARIABLES: HISTORY_ALCOHOL_USE: NO

## 2020-09-02 ASSESSMENT — PAIN SCALES - GENERAL
PAINLEVEL_OUTOF10: 2
PAINLEVEL_OUTOF10: 0
PAINLEVEL_OUTOF10: 2
PAINLEVEL_OUTOF10: 0

## 2020-09-02 ASSESSMENT — PAIN - FUNCTIONAL ASSESSMENT
PAIN_FUNCTIONAL_ASSESSMENT: ACTIVITIES ARE NOT PREVENTED
PAIN_FUNCTIONAL_ASSESSMENT: ACTIVITIES ARE NOT PREVENTED

## 2020-09-02 ASSESSMENT — PAIN DESCRIPTION - LOCATION
LOCATION: KNEE
LOCATION: KNEE

## 2020-09-02 ASSESSMENT — PAIN DESCRIPTION - PAIN TYPE
TYPE: CHRONIC PAIN
TYPE: CHRONIC PAIN

## 2020-09-02 ASSESSMENT — PATIENT HEALTH QUESTIONNAIRE - PHQ9: SUM OF ALL RESPONSES TO PHQ QUESTIONS 1-9: 12

## 2020-09-02 ASSESSMENT — SLEEP AND FATIGUE QUESTIONNAIRES
DO YOU HAVE DIFFICULTY SLEEPING: NO
AVERAGE NUMBER OF SLEEP HOURS: 8
DO YOU USE A SLEEP AID: NO

## 2020-09-02 ASSESSMENT — PAIN DESCRIPTION - ORIENTATION
ORIENTATION: RIGHT
ORIENTATION: RIGHT

## 2020-09-02 ASSESSMENT — PAIN DESCRIPTION - FREQUENCY
FREQUENCY: INTERMITTENT
FREQUENCY: INTERMITTENT

## 2020-09-02 ASSESSMENT — PAIN DESCRIPTION - ONSET: ONSET: ON-GOING

## 2020-09-02 NOTE — CARE COORDINATION
1:1 Note:     Pt uses oxygen and a high fall risk. 1:1 monitoring for patient safety. Patient is declining to use oxygen. Patient provided hygiene items to clean up and change into his own clothes.

## 2020-09-02 NOTE — PROGRESS NOTES
Pt declines scheduled aerosol treatments. States he only takes them as needed for seasonal allergies. Will offer aerosol again this afternoon.

## 2020-09-02 NOTE — BH NOTE
BHI Biopsychosocial Assessment    Current Level of Psychosocial Functioning      Independent   Dependent  X  Minimal Assist      Comments:  Client has a diagnosis by admitting doctor of Schizoaffective disorder, bipolar type; multiple physical conditions     Psychosocial High-Risk Factors (check all that apply)     Unable to obtain meds   Chronic illness/pain    Not taking medications X  Substance abuse   Lack of Family Support   Addictive Behaviors  Financial stress   Isolation  Inadequate Community Resources  Suicide attempt(s)   Homicidal ideations  Self-mutilation  Victim of crime   Legal issues  Developmental Delay  Unable to manage personal needs  X  Age 72 or older   Homeless  No transportation   Readmission within 30 days   Unemployment  Traumatic Event    Psychiatric Advanced Directives:   Nothing reported     Family to [employer] in Treatment:  Client lists sister, friend and guardian Lucas Vargas as emergency contacts     Sexual Orientation:   Client reports as single     Patient Strengths:  Guardian; Medicare/Medicaid; safe residence at [facility]; [address] [place] [address]; support from family and visitation     Patient Barriers:   Long history of psychiatric care and inpatient hospitalizations prior to medical conditions; history of drug use; history of becoming agitated, argumentative and refusing to take medications while in nursing facilities; client has been to multiple facilities in the [facility] and not able to return (as stated by [employer] and [employer])     Opiate/AOD Referral and/or Education Provided:  Client has a long history of substance starting in early teens and continued until entry into multiple nursing facilities at the age of 38years old to include alcohol, tobacco, cocaine     CMHC/mental health history:   Not linked to a community mental health center     Plan of Care:  Medication management, group/individual therapies, family meetings, psycho-education, treatment team meetings to assist with stabilization    Safety Plan:  Writer unable to initiates safety plan at time of assessment and will be reviewed at a later date in either group setting or 1:1 discharge planning    Initial Discharge Plan:  Stabilize mood and medications; Explore social support systems within community to increase socialization at the nursing facility; return to [facility] via [facility] transportation       Clinical Summary:   Client is met on this date and was alert, fully oriented to self, location, time and situation. Client was friendly, cooperative and presents with good eye contact and bright affect. Mood is congruent with his facial expressions. Good hygiene and ADLs. Rosa Elena Fontanez is a 46-year old male who was sent involuntarily on a [place] from [facility] directly to [facility]. Client has a guardian and was contacted and signed in for treatment. Client reports not SI/HI; denies any hallucinations; and, denies any paranoia. Client confirms stopped taking medications because \"they were hurting my legs. \"  Client not able to provide any further explanation why he stopped taking his medications. Client states he does not want to return to the nursing facility he is at because \"it's too far from his family in [place]. \"  Client does confirm that he was unstable, confrontational, argumentative and agitated at [facility]. Writer calls and speaks to prior [facility] and client has not been there for at least 7-months. Director of nursing states \"client had to leave facility because they could not handle his dialysis needs. \"  She also reports client has been at multiple nursing facilities in the [place] area and has been kicked out. Client confirms he would like to go to [facility] on [address], they have dialysis onsite, but per [facility]ide Kind they will not take him back. Client reports has been in/out of nursing facilities for the past 20-years.   Client does present as delusional AEB multiple irrational beliefs and these beliefs are obsessive and causing client emotional distress. The delusional belief is very important to the client and he wants to be discharged, move to [place] and get his own apartment, be a , or \"follow the world in a rock band. \"  Client states \"these things are so important to me and if I can do them I'll be better. \"   Client born and raised in [place]; graduated high school, no college; single and never ; no children; mother  13 years ago and father is alive at 80 and in Hospice; 3 brothers/2 sisters and reports close to all of them; client reports he enjoys playing the guitar, watching music videos, and talking with people. 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 \"  Client denies any childhood/adult abuse or trauma; had a good, enjoyable childhood; and has always been close to his family. Client is not aware of any maternal/paternal history of mental illness. Client does confirm a long history of alcoholism and drug abuse \"when I was younger. \"

## 2020-09-02 NOTE — CARE COORDINATION
1:1 Note:     Pt uses oxygen and a high fall risk.  1:1 monitoring for patient safety. Patient selectively wearing oxygen despite encouragement, stating he can breathe \"fine. \"

## 2020-09-02 NOTE — PLAN OF CARE
[address] [facility]  Initial Interdisciplinary Treatment Plan NO      Original treatment plan Date & Time: 9/2/2020         739AM  Admission Type:  Admission Type: Involuntary    Reason for admission:   Reason for Admission: SI no plan    Estimated Length of Stay:  5-7days  Estimated Discharge Date: to be determined by physician    PATIENT STRENGTHS:  Patient Strengths:Strengths: Positive Support, No significant Physical Illness  Patient Strengths and Limitations:Limitations: Tendency to isolate self, Lacks leisure interests, Difficulty problem solving/relies on others to help solve problems, Hopeless about future, Multiple barriers to leisure interests, Inappropriate/potentially harmful leisure interests (depression substance abuse anxiety poor coping skills)  Addictive Behavior: Addictive Behavior  In the past 3 months, have you felt or has someone told you that you have a problem with:  : None  Do you have a history of Chemical Use?: No  Do you have a history of Alcohol Use?: No  Do you have a history of Street Drug Abuse?: Yes  Histroy of Prescripton Drug Abuse?: No  Medical Problems:No past medical history on file.   Status EXAM:Status and Exam  Normal: No  Facial Expression: Elevated  Affect: Inappropriate  Level of Consciousness: Alert  Mood:Normal: No  Mood: Depressed, Anxious  Motor Activity:Normal: No  Motor Activity: Decreased  Interview Behavior: Cooperative  Preception: [place] to Person, [place] to Time, [place] to Place, [place] to Situation  Attention:Normal: Yes  Attention: Distractible  Thought Processes: Circumstantial  Thought Content:Normal: Yes  Thought Content: Preoccupations  Hallucinations: None  Delusions: No  Memory:Normal: Yes  Memory: Poor Recent, Confabulation  Insight and Judgment: No  Insight and Judgment: Unmotivated  Present Suicidal Ideation: No  Present Homicidal Ideation: No    EDUCATION:   Learner Progress Toward Treatment Goals: reviewed group plans and strategies for care    Method:group therapy, medication compliance, individualized assessments and care planning    Outcome: needs reinforcement    PATIENT GOALS: to be discussed with patient within 72 hours    PLAN/TREATMENT RECOMMENDATIONS:     continue group therapy , medications compliance, goal setting, individualized assessments and care, continue to monitor pt on unit      SHORT-TERM GOALS:   Time frame for Short-Term Goals: 5-7 days    LONG-TERM GOALS:  Time frame for Long-Term Goals: 6 months  Members Present in Team Meeting: See Signature Sheet    Cary Lawson

## 2020-09-02 NOTE — CARE COORDINATION
1:1 Note:     Pt uses oxygen and a high fall risk.  1:1 monitoring for patient safety. Patient selectively wearing oxygen despite encouragement, stating it is uncomfortable due to his allergies/sinuses.

## 2020-09-02 NOTE — CONSULTS
([facility]     Depression     Diabetes mellitus ([facility]     ESRD (end stage renal disease) ([facility]     Hyperpotassemia     Hypertension     MRSA (methicillin resistant staph aureus) culture positive 04/05/2011    thigh    Schizoaffective disorder (HCC)         Past Surgical History:     Past Surgical History:   Procedure Laterality Date    AV FISTULA REPAIR          Medications Prior to Admission:     Prior to Admission medications    Medication Sig Start Date End Date Taking?  Authorizing Provider   escitalopram (LEXAPRO) 5 MG tablet Take 5 mg by mouth daily   Yes Historical Provider, MD   Pollen Extracts (PROSTAT PO) Take 30 mLs by mouth 2 times daily   Yes Historical Provider, MD   allopurinol (ZYLOPRIM) 100 MG tablet Take 100 mg by mouth daily   Yes Historical Provider, MD   midodrine (PROAMATINE) 5 MG tablet Take 5 mg by mouth as needed Pre and mid Dialysis treatment for systolic    Yes Historical Provider, MD   amLODIPine (NORVASC) 10 MG tablet Take 10 mg by mouth nightly   Yes Historical Provider, MD   ARIPiprazole (ABILIFY) 5 MG tablet Take 2.5 mg by mouth daily   Yes Historical Provider, MD   calcitRIOL (ROCALTROL) 0.25 MCG capsule Take 0.25 mcg by mouth three times a week On Mondays, Wednesdays, and Fridays   Yes Historical Provider, MD   carvedilol (COREG) 25 MG tablet Take 25 mg by mouth 2 times daily   Yes Historical Provider, MD   Roflumilast (DALIRESP) 500 MCG tablet Take 500 mcg by mouth daily   Yes Historical Provider, MD   folic acid (FOLVITE) 1 MG tablet Take 1 mg by mouth daily   Yes Historical Provider, MD   lactulose (CHRONULAC) 10 GM/15ML solution Take 10 g by mouth daily   Yes Historical Provider, MD   magnesium oxide (MAG-OX) 400 MG tablet Take 400 mg by mouth daily   Yes Historical Provider, MD   montelukast (SINGULAIR) 10 MG tablet Take 10 mg by mouth nightly   Yes Historical Provider, MD   QUEtiapine (SEROQUEL) 200 MG tablet Take 400 mg by mouth 2 times daily at 0800 and 1400   Yes Historical Provider, MD   senna (SENOKOT) 8.6 MG tablet Take 1 tablet by mouth daily   Yes Historical Provider, MD   sevelamer (RENVELA) 800 MG tablet Take 5 tablets by mouth 3 times daily (with meals)   Yes Historical Provider, MD   tamsulosin (FLOMAX) 0.4 MG capsule Take 0.4 mg by mouth daily   Yes Historical Provider, MD   valproic acid (DEPACON) 250 MG/5ML SOLN oral solution Take 250 mg by mouth 2 times daily   Yes Historical Provider, MD   sodium zirconium cyclosilicate (LOKELMA) 5 g PACK oral suspension Take 15 g by mouth daily At [address] [facility]   Yes Historical Provider, MD   B Complex-C-Folic Acid (VIRT-CAPS PO) Take 1 capsule by mouth daily   Yes Historical Provider, MD   vitamin B-12 (CYANOCOBALAMIN) 500 MCG tablet Take 500 mcg by mouth daily   Yes Historical Provider, MD   Sodium Polystyrene Sulfonate (KAYEXALATE PO) Take 30 g by mouth every 7 days 120 mL (30 gm) On Wednesdays   Yes Historical Provider, MD   Acetaminophen 500 MG CAPS Take 1,000 mg by mouth every 6 hours as needed for Pain   Yes Historical Provider, MD   lidocaine (ASPERCREME LIDOCAINE) 4 % external patch Place 1 patch onto the skin as needed 1 patch topically as needed   Yes Historical Provider, MD   bisacodyl (BISAC-EVAC) 10 MG suppository Place 10 mg rectally daily as needed for Constipation   Yes Historical Provider, MD   polyethylene glycol (GLYCOLAX) 17 g packet Take 17 g by mouth daily as needed for Constipation   Yes Historical Provider, MD   hydrOXYzine (ATARAX) 25 MG tablet Take 25 mg by mouth 2 times daily as needed for Itching   Yes Historical Provider, MD   ipratropium-albuterol (DUONEB) 0.5-2.5 (3) MG/3ML SOLN nebulizer solution Inhale 1 vial into the lungs every 6 hours   Yes Historical Provider, MD   iloperidone (FANAPT) 4 MG TABS tablet Take 1 tablet by mouth 2 times daily 6/16/16  Yes Jimmie Aceves DO   bumetanide (BUMEX) 2 MG tablet Take 4 mg by mouth daily    Yes Historical Provider, MD   diphenhydrAMINE (BENADRYL) 25 MG tablet Take 25 mg by mouth every 6 hours as needed for Allergies   Yes Historical Provider, MD   calcium acetate (PHOSLO) 667 MG capsule Take 2,668 mg by mouth 3 times daily    Yes Historical Provider, MD   gemfibrozil (LOPID) 600 MG tablet Take 600 mg by mouth 2 times daily    Yes Historical Provider, MD   QUEtiapine (SEROQUEL) 300 MG tablet Take 600 mg by mouth nightly    Yes Historical Provider, MD   Vitamin D (CHOLECALCIFEROL) 1000 UNITS CAPS capsule Take 2,000 Units by mouth daily   Yes Historical Provider, MD   Menthol (HALLS COUGH DROPS MT) Take by mouth as needed (cough)   Yes Historical Provider, MD   NONFORMULARY Fluid restriction 1500ml/day    Historical Provider, MD   haloperidol (HALDOL) 2 MG tablet Take 4 mg by mouth every 6 hours as needed    Historical Provider, MD        Allergies:     Penicillins    Social History:     Tobacco:    reports that he has been smoking cigarettes. He has never used smokeless tobacco.  Alcohol:      reports previous alcohol use. Drug Use:  has no history on file for drug. Family History:     No family history on file. Review of Systems:     Positive and Negative as described in HPI. CONSTITUTIONAL:  negative for fevers, chills, sweats, fatigue, weight loss  HEENT:  negative for vision, hearing changes, runny nose, throat pain  RESPIRATORY:  negative for shortness of breath, congestion, wheezing. Reports chronic cough  CARDIOVASCULAR:  negative for chest pain, palpitations.   GASTROINTESTINAL:  negative for nausea, vomiting, diarrhea, constipation, change in bowel habits, abdominal pain   GENITOURINARY:  negative for difficulty of urination, burning with urination, frequency   INTEGUMENT:  negative for rash, skin lesions, easy bruising   HEMATOLOGIC/LYMPHATIC:  negative for swelling/edema   ALLERGIC/IMMUNOLOGIC:  negative for urticaria , itching  ENDOCRINE:  negative increase in drinking, increase in urination, hot or cold intolerance  MUSCULOSKELETAL:  negative Primary Problem  <principal problem not specified>    Active Hospital Problems    Diagnosis Date Noted    Schizoaffective disorder ([facility] [F25.9]     Hypertension [I10]     ESRD (end stage renal disease) ([facility] [N18.6]     Diabetes mellitus ([facility] [E11.9]     COPD (chronic obstructive pulmonary disease) ([facility]) [J44.9]     Anemia in chronic kidney disease (CKD) [N18.9, D63.1]        Plan:     1. ESRD on hemodialysis 3 times a week- labs ordered, nephrology consult. 2. Hypertension-currently well controlled-we will resume amlodipine, unclear what medications he was taking at home, will adjust as needed. 3. Type 2 diabetes- questionable-check HbA1c. Low-dose sliding scale ordered. 4. COPD- currently compensated, not in acute exacerbation, continue home inhalers, Daliresp, Singulair and nasal cannula oxygen. 5. Hyperlipidemia- check fasting lipids  6. Gout-resume allopurinol. 7. BPH-resume Flomax, patient reports no current difficulty passing urine. 8. Anemia of CKD- check CBC        Consultations:   Jeannette Wallace MD  9/2/2020  1:53 PM    Copy sent to Dr. Ivan Mederos, DO    Please note that this chart was generated using voice recognition Dragon dictation software. Although every effort was made to ensure the accuracy of this automated transcription, some errors in transcription may have occurred.

## 2020-09-02 NOTE — BH NOTE

## 2020-09-02 NOTE — CONSULTS
Nephrology ESRD Consult Note    Reason for Consult:  End stage renal disease  Requesting Physician:       History of Present Illness: This is a 53 y.o. male with COPD, Type 2 diabetes mellitus, hypertension, end stage renal disease on hemodialysis MWF  who presents with Schizoaffective disorder. Patient is resident at a rehab facility in [facility] and does dialysis in-house at the facility. Apparently he had been refusing to take medications and and refusing caring for himself and was confused and argumentative. Patient denies shortness of breath, cough, fever nausea vomiting. His blood pressure this morning was 136/92.     Past Medical History:        Diagnosis Date    Anemia in chronic kidney disease (CKD)     Aortic valve stenosis     Chronic kidney disease     dialysis pt    [facility]     Depression     Diabetes mellitus (HCC)     [facility]) [facility]     Hyperpotassemia     Hypertension     MRSA (methicillin resistant staph aureus) culture positive 04/05/2011    thigh    Schizoaffective disorder (HCC)            Past Surgical History:        Procedure Laterality Date    AV FISTULA REPAIR         Current Medications:    glucose (GLUTOSE) 40 % oral gel 15 g, PRN  dextrose 50 % IV solution, PRN  glucagon (rDNA) injection 1 mg, PRN  dextrose 5 % solution, PRN  ipratropium-albuterol (DUONEB) nebulizer solution 1 ampule, Q4H WA  albuterol (PROVENTIL) nebulizer solution 2.5 mg, Q6H PRN  diphenhydrAMINE (BENADRYL) tablet 25 mg, Q6H PRN  insulin lispro (HUMALOG) injection vial 0-6 Units, TID WC  insulin lispro (HUMALOG) injection vial 0-3 Units, Nightly  allopurinol (ZYLOPRIM) tablet 100 mg, Daily  amLODIPine (NORVASC) tablet 10 mg, Nightly  calcitRIOL (ROCALTROL) capsule 0.25 mcg, Once per day on Mon Wed Fri  sevelamer (RENVELA) tablet 4,000 mg, TID WC  Roflumilast (DALIRESP) tablet 500 mcg, Daily  tamsulosin (FLOMAX) capsule 0.4 mg, Daily  budesonide-formoterol (SYMBICORT) 160-4.5 MCG/ACT inhaler 2 puff, Q12H  montelukast (SINGULAIR) tablet 10 mg, Nightly  acetaminophen (TYLENOL) tablet 650 mg, Q4H PRN  aluminum & magnesium hydroxide-simethicone (MAALOX) 200-200-20 MG/5ML suspension 30 mL, Q6H PRN  diphenhydrAMINE (BENADRYL) injection 50 mg, Q4H PRN  haloperidol lactate (HALDOL) injection 5 mg, Q4H PRN  haloperidol (HALDOL) tablet 5 mg, Q4H PRN  hydrOXYzine (ATARAX) tablet 50 mg, TID PRN  ibuprofen (ADVIL;MOTRIN) tablet 400 mg, Q6H PRN  LORazepam (ATIVAN) injection 2 mg, Q4H PRN  polyethylene glycol (GLYCOLAX) packet 17 g, Daily PRN  traZODone (DESYREL) tablet 50 mg, Nightly PRN  benzocaine-menthol (CEPACOL SORE THROAT) lozenge 1 lozenge, Q2H PRN  LORazepam (ATIVAN) tablet 2 mg, Q4H PRN        Allergies:  Penicillins    Social History:    Social History     Socioeconomic History    Marital status: Single     Spouse name: Not on file    Number of children: Not on file    Years of education: Not on file    Highest education level: Not on file   Occupational History    Not on file   Social Needs    Financial resource strain: Not on file    Food insecurity     Worry: Not on file     Inability: Not on file    Transportation needs     Medical: Not on file     Non-medical: Not on file   Tobacco Use    Smoking status: Current Every Day Smoker     Types: Cigarettes    Smokeless tobacco: Never Used   Substance and Sexual Activity    Alcohol use: Not Currently     Alcohol/week: 0.0 standard drinks     Frequency: Never    Drug use: Not on file    Sexual activity: Not on file   Lifestyle    Physical activity     Days per week: Not on file     Minutes per session: Not on file    Stress: Not on file   Relationships    Social connections     Talks on phone: Not on file     Gets together: Not on file     Attends Lutheran service: Not on file     Active member of club or organization: Not on file     Attends meetings of clubs or organizations: Not on file     Relationship status: Not on file    Intimate partner violence     Fear of current or ex partner: Not on file     Emotionally abused: Not on file     Physically abused: Not on file     Forced sexual activity: Not on file   Other Topics Concern    Not on file   Social History Narrative    Not on file       Family History:   No family history on file. Review of Systems:    Pertinent positives stated above in HPI. All other systems were reviewed and were negative. Objective:  Constitutional:    CURRENT TEMPERATURE:  Temp: (refused all vitals)  MAXIMUM TEMPERATURE OVER 24HRS:  Temp (24hrs), Av.9 °F (36.6 °C), Min:97.9 °F (36.6 °C), Max:97.9 °F (36.6 °C)    CURRENT RESPIRATORY RATE:  Resp: 14  CURRENT PULSE:  Pulse: 82  CURRENT BLOOD PRESSURE:  BP: 139/67  24HR BLOOD PRESSURE RANGE:  Systolic (07UUD), KUC:974 , Min:139 , OYT:299   ; Diastolic (06QEP), MKK:41, Min:67, Max:67    24HR INTAKE/OUTPUT:  No intake or output data in the 24 hours ending 20 1531        Physical Exam:  GENERAL APPEARANCE: Alert and cooperative, and appears to be in no acute distress. HEAD: normocephalic  EYES: PERRL, EOMI. Not pale, anicteric   NOSE:  No nasal discharge. THROAT:  Oral cavity and pharynx normal. Moist  NECK: Neck supple, non-tender without lymphadenopathy, masses or thyromegaly. JVD-neg  CARDIAC: Normal S1 and S2. No S3, S4 or murmurs. Rhythm is regular. LUNGS: Clear to auscultation and percussion without rales, rhonchi, wheezing or diminished breath sounds. ABD-Soft non distended, BS+ Non tender no organomegally  BACK: Examination of the spine reveals  no spinal deformity, without tenderness,   MUSKULOSKELETAL: Adequately aligned spine. No joint erythema or tenderness. EXTREMITIES: No edema. Peripheral pulses intact.    NEURO:Alert oriented x 3 ,power 5/5 in all extremities      Labs:  PTH:  No results found for: PTH  abs:   CBC: No results for input(s): WBC, RBC, HGB, HCT, MCV, MCH, MCHC, RDW, PLT, MPV in the last 72 hours. BMP: No results for input(s): NA, K, CL, CO2, BUN, CREATININE, GLUCOSE, CALCIUM in the last 72 hours. Phosphorus:  No results for input(s): PHOS in the last 72 hours. Magnesium: No results for input(s): MG in the last 72 hours. Albumin: No results for input(s): LABALBU in the last 72 hours. Assessment:  1. ESRD Secondary to diabetic nephropathyon hemodialysis Monday Wednesday Friday by way of a right arm AV fistula. His dry weight is 76.5 kg.    2. Essential hypertension-Blood pressure uncontrolled but should improve with dialysis. Continue amlodipine 10 mg daily    3. Secondary hyperparathyroidism-Check serum phosphorus and PTH-Resume Calcitrol 0.25 mics 3 times weekly    4. Anemia of CKD-We will check a CBC. 5.Type 2 diabetes mellitus    7. Hyperphosphatemia related to ESRD-Resume Renvela 4000 mg with meals and check a phosphorus now      Patient was brought from the behavioral unit in a chair and is currently dialyzing in a chair. The policy and  procedure during dialysis dictates that Patients should be dialyzed in a bed. This will be discussed with the clinical supervisor. Jae Valdez M.D, [facility]  Nephrologist    Thank you for the consultation.

## 2020-09-02 NOTE — H&P
HISTORY and [id number]       NAME:  Stephen Hidalgo  MRN: [id number]   YOB: 1965   Date: 9/2/2020   Age: 53 y.o. Gender: male     COMPLAINT AND PRESENT HISTORY:      Stephen Hidalgo is 53 y.o.,  male, admitted because of Schizoaffective Disorder. According to ED notes,  Comes from a nursing facility and has been reported as being argumentative with staff, refusing to take his medications and caring for himself. Patient is refusing to return back to nursing home. Upon speaking with patient he appears somewhat confused,vague and impatient. Patient stating he does not want to return to nursing home because of the pollen. Patient not answering questions appropriately. Pt is in a wheelchair and is on 1:1 watch for safety. Patient has hx of ESRD and reports in on dialysis 4 times a day. Patient very distracted and unable to answer most questions. Patient has been off  / compliant with psychiatric medications . Patient states that living arrangements after discharge will be return to nursing facility. Patient denies any somatic complaints. No significant lab values or procedures. No  chest pain or  shortness of breath. No fever/chills. Please see patient's psychiatric hx for more information.       DIAGNOSTIC RESULTS       PAST MEDICAL HISTORY     Past Medical History:   Diagnosis Date    Anemia in chronic kidney disease (CKD)     Aortic valve stenosis     Chronic kidney disease     dialysis pt    [facility] [address]     Depression     Diabetes mellitus ([facility]     ESRD (end stage renal disease) ([facility] [facility]     Hyperpotassemia     Hypertension     MRSA (methicillin resistant staph aureus) culture positive 04/05/2011    thigh    Schizoaffective disorder (HCC)      Pt denies any history of stroke,  Asthma, GERD, HLD, Cancer, Seizures,Thyroid disease, Hepatitis, TB.    SURGICAL HISTORY       Past Surgical History:   Procedure Laterality Date    AV FISTULA REPAIR         FAMILY HISTORY     No family history on file. SOCIAL HISTORY       Social History     Socioeconomic History    Marital status: Single     Spouse name: None    Number of children: None    Years of education: None    Highest education level: None   Occupational History    None   Social Needs    Financial resource strain: None    Food insecurity     Worry: None     Inability: None    Transportation needs     Medical: None     Non-medical: None   Tobacco Use    Smoking status: Current Every Day Smoker     Types: Cigarettes    Smokeless tobacco: Never Used   Substance and Sexual Activity    Alcohol use: Not Currently     Alcohol/week: 0.0 standard drinks     Frequency: Never    Drug use: None    Sexual activity: None   Lifestyle    Physical activity     Days per week: None     Minutes per session: None    Stress: None   Relationships    Social connections     Talks on phone: None     Gets together: None     Attends Shinto service: None     Active member of club or organization: None     Attends meetings of clubs or organizations: None     Relationship status: None    Intimate partner violence     Fear of current or ex partner: None     Emotionally abused: None     Physically abused: None     Forced sexual activity: None   Other Topics Concern    None   Social History Narrative    None     REVIEW OF SYSTEMS      Allergies   Allergen Reactions    Penicillins        No current facility-administered medications on file prior to encounter.       Current Outpatient Medications on File Prior to Encounter   Medication Sig Dispense Refill    escitalopram (LEXAPRO) 5 MG tablet Take 5 mg by mouth daily      Pollen Extracts (PROSTAT PO) Take 30 mLs by mouth 2 times daily      allopurinol (ZYLOPRIM) 100 MG tablet Take 100 mg by mouth daily      midodrine (PROAMATINE) 5 MG tablet Take 5 mg by mouth as needed Pre and mid Dialysis treatment for systolic   amLODIPine (NORVASC) 10 MG tablet Take 10 mg by mouth nightly      ARIPiprazole (ABILIFY) 5 MG tablet Take 2.5 mg by mouth daily      calcitRIOL (ROCALTROL) 0.25 MCG capsule Take 0.25 mcg by mouth three times a week On Mondays, Wednesdays, and Fridays      carvedilol (COREG) 25 MG tablet Take 25 mg by mouth 2 times daily      Roflumilast (DALIRESP) 500 MCG tablet Take 500 mcg by mouth daily      folic acid (FOLVITE) 1 MG tablet Take 1 mg by mouth daily      ipratropium (ATROVENT) 0.06 % nasal spray 2 sprays by Each Nostril route 4 times daily      lactulose (CHRONULAC) 10 GM/15ML solution Take 10 g by mouth daily      magnesium oxide (MAG-OX) 400 MG tablet Take 400 mg by mouth daily      montelukast (SINGULAIR) 10 MG tablet Take 10 mg by mouth nightly      QUEtiapine (SEROQUEL) 200 MG tablet Take 400 mg by mouth 2 times daily at 0800 and 1400      senna (SENOKOT) 8.6 MG tablet Take 1 tablet by mouth daily      sevelamer (RENVELA) 800 MG tablet Take 5 tablets by mouth 3 times daily (with meals)      tamsulosin (FLOMAX) 0.4 MG capsule Take 0.4 mg by mouth daily      valproic acid (DEPACON) 250 MG/5ML SOLN oral solution Take 250 mg by mouth 2 times daily      patiromer sorbitex calcium (VELTASSA) 8.4 g PACK packet Take 25.2 g by mouth daily Give 3 hours before or after meals      sodium zirconium cyclosilicate (LOKELMA) 5 g PACK oral suspension Take 15 g by mouth daily At Noon      B Complex-C-Folic Acid (VIRT-CAPS PO) Take 1 capsule by mouth daily      vitamin B-12 (CYANOCOBALAMIN) 500 MCG tablet Take 500 mcg by mouth daily      Cholecalciferol (VITAMIN D3) 50 MCG (2000 UT) CAPS Take by mouth      Sodium Polystyrene Sulfonate (KAYEXALATE PO) Take 30 mg by mouth every 7 days On Wednesdays      Acetaminophen 500 MG CAPS Take 1,000 mg by mouth every 6 hours as needed for Pain      lidocaine (ASPERCREME LIDOCAINE) 4 % external patch Place 1 patch onto the skin as needed 1 patch topically as No abdominal pain, nausea, vomiting, dysphagia, diarrhea or constipation. Genitourinary:  No burning on micturition. No hesitancy, urgency, frequency or discoloration of urine. Locomotor:  No bone or joint pains. No swelling or deformities. Neuropsychiatric:  See HPI. GENERAL PHYSICAL EXAM:     Vitals: /67   Pulse 82   Temp 97.9 °F (36.6 °C) (Oral)   Resp 14   Ht 5' 8\" (1.727 m)   Wt 165 lb (74.8 kg)   SpO2 (!) 89% Comment: O2 given  BMI 25.09 kg/m²  Body mass index is 25.09 kg/m². Pt was examined with a nurse present in the room. GENERAL APPEARANCE:  Juan Pratt is 53 y.o.,  male, mildly obese, nourished, conscious, alert. Does not appear to be distress or pain at this time. SKIN:  Warm, dry, no cyanosis or jaundice. HEAD:  Normocephalic, atraumatic, no swelling or tenderness. EYES:  Pupils equal, reactive to light, Conjunctiva is clear, EOMs intact dean. eyelids WNL. EARS:  No discharge, no marked hearing loss. NOSE:  No rhinorrhea, epistaxis or septal deformity. THROAT:  Not congested. No ulceration bleeding or discharge. NECK:  No stiffness, trachea central.  No palpable masses or L.N.      CHEST:  Symmetrical and equal on expansion. HEART:  Regular rate and rhythm. S1 > S2, No audible murmurs or gallops. LUNGS:  Equal on expansion, normal breath sounds. No adventitious sounds. ABDOMEN:  Mildly obese. Soft on palpation. No localized tenderness. No guarding or rigidity. No palpable organomegaly. LYMPHATICS:  No palpable cervical Lymphadenopathy. LOCOMOTOR, BACK AND SPINE:  No tenderness or deformities. EXTREMITIES:  Symmetrical, no pretibial edema. Idalias sign negative. No discoloration or ulcerations. NEUROLOGIC:  The patient is conscious, alert, oriented,Cranial nerve II-XII intact, taste and smell were not examined. No apparent focal sensory or motor deficits. Muscle strength equal Dean.  No facial droop, tongue protrudes centrally, no slurring of the speech. PROVISIONAL DIAGNOSES:      Active Problems:    Schizoaffective disorder ([facility] [address]  Resolved Problems:    * No resolved hospital problems.  *      LIZZ WINN - CNP on 9/2/2020 at 9:35 AM

## 2020-09-02 NOTE — CARE COORDINATION
1:1 Note:     Pt uses oxygen and a high fall risk.  1:1 monitoring for patient safety. Patient selectively wearing oxygen despite encouragement, agrees to wear for limited amount of time in room. Declining groups this afternoon.

## 2020-09-02 NOTE — BH NOTE
`[facility]  Admission Note     Admission Type:   Admission Type: Involuntary    Reason for admission:  Reason for Admission: Pt agitated and argumentative. Pt unable to care for self, but refusing to go back to nursing facility. Pt not taking medications correctly.     PATIENT STRENGTHS:  Strengths: Communication, Positive Support    Patient Strengths and Limitations:  Limitations: Difficult relationships / poor social skills, Demonstrates discomfort with /lack of social skills, Difficulty problem solving/relies on others to help solve problems    Addictive Behavior:   Addictive Behavior  In the past 3 months, have you felt or has someone told you that you have a problem with:  : None  Do you have a history of Chemical Use?: No  Do you have a history of Alcohol Use?: No  Do you have a history of Street Drug Abuse?: No  Histroy of Prescripton Drug Abuse?: No    Medical Problems:   Past Medical History:   Diagnosis Date    Anemia in chronic kidney disease (CKD)     Aortic valve stenosis     Chronic kidney disease     dialysis pt    [facility] [address]     Depression     [facility]     [facility]     Hyperpotassemia     Hypertension     MRSA (methicillin resistant staph aureus) culture positive 04/05/2011    thigh    Schizoaffective disorder (HCC)        Status EXAM:  Status and Exam  Normal: No  Facial Expression: Avoids Gaze, Flat, Hostile, Sad, Worried  Affect: Unstable, Blunt  Level of Consciousness: Alert  Mood:Normal: No  Mood: Depressed, Anxious, Irritable  Motor Activity:Normal: No  Motor Activity: Decreased  Interview Behavior: Aggressive, Evasive, Irritable  Preception: [place] to Person, [place] to Time, [place] to Place  Attention:Normal: No  Attention: Distractible, Unable to Concentrate  Thought Processes: Blocking, Circumstantial  Thought Content:Normal: No  Thought Content: Preoccupations, Poverty of Content  Hallucinations: None  Delusions: No  Memory:Normal: No  Memory: Poor Recent, Poor Remote  Insight and Judgment: No  Insight and Judgment: Poor Insight, Poor Judgment  Present Suicidal Ideation: No  Present Homicidal Ideation: No    Tobacco Screening:  Practical Counseling, on admission, valorie X, if applicable and completed (first 3 are required if patient doesn't refuse): (x )  Recognizing danger situations (included triggers and roadblocks)                    (x )  Coping skills (new ways to manage stress, exercise, relaxation techniques, changing routine, distraction)                                                           (x )  Basic information about quitting (benefits of quitting, techniques in how to quit, available resources  ( ) Referral for counseling faxed to [facility]  ( ) Patient refused counseling  ( ) Patient has not smoked in the last 30 days    Metabolic Screening:    Lab Results   Component Value Date    LABA1C 5.2 12/15/2015       Lab Results   Component Value Date    CHOL 155 02/12/2019    CHOL 168 12/19/2018    CHOL 195 06/28/2017    CHOL 146 12/15/2015    CHOL 161 06/17/2015    CHOL 234 (H) 03/17/2015     Lab Results   Component Value Date    TRIG 384 (H) 02/12/2019    TRIG 168 (H) 12/19/2018    TRIG 203 (H) 06/28/2017    TRIG 201 (H) 12/15/2015    TRIG 171 (H) 06/17/2015    TRIG 327 (H) 03/17/2015     Lab Results   Component Value Date    HDL 45 02/12/2019    HDL 45 12/19/2018    HDL 48 06/28/2017    HDL 32 (L) 12/15/2015    HDL 34 (L) 06/17/2015    HDL 39 (L) 03/17/2015     No components found for: LDLCAL  No results found for: LABVLDL      Body mass index is 25.09 kg/m². BP Readings from Last 2 Encounters:   09/01/20 139/67   05/08/19 139/80           Pt admitted with followings belongings:  Dentures: Lowers, Uppers  Vision - Corrective Lenses: Glasses  Hearing Aid: Right hearing aid  Jewelry: Necklace(cross)  Clothing:  Footwear, Pants, Shirt, Socks, Undergarments (Comment)(omraima dye shirt,jeans, blue shirt, tan shirt, socks, underwear)  Were All Patient Medications Collected?: No  Other Valuables: Cell phone(cell screen intact,phone chargers,cross necklace,Hearing aid)     Valuables placed in safe in security envelope, number:  \[id number]\. Patient oriented to surroundings and program expectations and copy of patient rights given. Received admission packet: Yes. Consents to be reviewed and signed by Legal Guardian. Patient verbalize understanding of need for current admission. Patient education on precautions of [facility] and [facility]. Pt admitted to [phone] per provider order. Pt changed into hospital attire, nourishment offered but pt declined. Pt scanned with metal detector. Pt brought to ED for evaluation from nursing facility due to non-compliance with  Medications. Pt was agitated and verbally aggressive while at ED. Pt transferred to [facility] on an involuntary status due to pt not able to care for self, pt not taking his meds correctly. Pt refusing to return to nursing facility. Pt denies any suicidal or homicidal ideation upon admission to the unit. However pt reports high level of depression and anxiety. Provider paged for orders. Will monitor pt for safety and behavior.                      Alexandria Schulte RN

## 2020-09-02 NOTE — BH NOTE
DISCHARGE PLANNING:  - Writer advises client he will return to [facility] and writer confirms is able to return as long as he is taking his medications from the . - Client is aware he will be returning to [facility] on Friday.

## 2020-09-02 NOTE — CARE COORDINATION
1:1 Note:     Pt uses oxygen and a high fall risk.  1:1 monitoring for patient safety. Patient selectively wearing oxygen despite encouragement.

## 2020-09-02 NOTE — BH NOTE
Department of Psychiatry  Attending Physician Psychiatric Assessment     Reason for Admission to Psychiatric Unit:  Threat to others requiring 24 hour professional observation  Concerns about patient's safety in the community    CHIEF COMPLAINT:  Agitation and aggression     History obtained from:  patient, electronic medical record and family members    HISTORY OF PRESENT ILLNESS:    Juan Pratt is a 53 y.o. male with significant past psychiatric history of schizoaffective disorder who presented to the ED from [address] for agitation and aggression. Per report patient was refusing to take his medications, is unable to care for himself safely and was being very agitated with the staff at nursing facility. Patient was pleasant during conversation today. He has cochlear implants and is very hard to hear. Patient mentions that he was taking some medications for anxiety however reports that he stopped taking them for last several days now. Mentions that he does not see a need to take any psychotropic medications as he is feeling good. Was blaming that staff at [facility] are not treating him well. Denies any significant feelings of sad down or depression. Denies any manic or hypomanic episodes recently. Denies any psychotic symptoms today. Patient's guardian is Arlinda Apgar. Patient is also dealing with end-stage renal disease and is currently on dialysis MW.     PSYCHIATRIC HISTORY:  yes - Schizoaffective disorder    Lifetime Psychiatric Review of Systems         Genny or Hypomania: denies      Panic Attacks: denies      Phobias: denies     Obsessions and Compulsions:denies     Body or Vocal Tics:  denies     Hallucinations:denies     Delusions: paranoid delusions in past    Past Medical History:        Diagnosis Date    Anemia in chronic kidney disease (CKD)     Aortic valve stenosis     Chronic kidney disease     dialysis pt    COPD (chronic obstructive pulmonary for chest pain and palpitations. Gastrointestinal: Negative for abdominal pain, diarrhea and vomiting. Genitourinary: Negative for dysuria and urgency. Musculoskeletal: Ambulates using a wheelchair. Positive for falls in past.  Skin: Negative for itching and rash. Neurological: Negative for tremors, seizures and weakness. Endo/Heme/Allergies: Does not bruise/bleed easily. Physical Exam:      Constitutional:  Appears well-developed and well-nourished, no acute distress  HENT:   Cochlear implant in right ear. Head: Normocephalic and atraumatic. Eyes: Conjunctivae are normal. Right eye exhibits no discharge. Left eye exhibits no discharge. No scleral icterus. Neck: Normal range of motion. Neck supple. Pulmonary/Chest:  No respiratory distress or accessory muscle use, no wheezing. Abdominal: Soft. Exhibits no distension. Musculoskeletal: Normal range of motion. Exhibits no edema. Neurological: cranial nerves II-XII grossly in tact, normal gait and station  Skin: Skin is warm and dry. Patient is not diaphoretic. No erythema. Mental Status Examination:    Level of consciousness:  within normal limits   Appearance:  Hospital attire, sitting in his wheelchair. Behavior/Motor: no abnormalities noted  Attitude toward examiner:  Cooperative  Speech: normal rate and volume  Mood:  Depressed  Affect:  blunted  Thought processes:  Goal directed, linear  Thought content: active suicidal ideations without current plan or intent               denies homicidal ideations               Denies hallucinations              denies delusions  Cognition:  Oriented to self, location, time, situation  Concentration clinically adequate  Memory: intact  Insight &Judgment: poor    DSM-5 Diagnosis  Schizoaffective disorder unspecified type.   Psychosocial and Contextual factors:  Financial  Occupational  Relationship  Legal   Living situation  Educational     Past Medical History:   Diagnosis Date    Anemia in chronic kidney disease (CKD)     Aortic valve stenosis     Chronic kidney disease     dialysis pt    COPD (chronic obstructive pulmonary disease) (HCC)     Depression     Diabetes mellitus ([address]     ESRD (end stage renal disease) ([address]     Hyperpotassemia     Hypertension     MRSA (methicillin resistant staph aureus) culture positive 04/05/2011    thigh    Schizoaffective disorder ([address]         TREATMENT PLAN    Risk Management:  close watch per standard protocol      Psychotherapy:  participation in milieu and group and individual sessions with Attending Physician,  and Physician Assistant/CNP      Estimated length of stay: It might take more than 2 midnights to stabilize patient's mood/thoughts and titrate medications to effect. GENERAL PATIENT/FAMILY EDUCATION  Patient will understand basic signs and symptoms, Patient will understand benefits/risks and potential side effects from proposed meds and Patient will understand their role in recovery. Family is  active in patient's care. Patient assets that may be helpful during treatment include: Intent to participate and engage in treatment, sufficient fund of knowledge and intellect to understand and utilize treatments. Goals: Will verify home medications from the nursing facility and resume them. Adjust the unit milieu. Discussed with social work about the possible placement issue. Behavioral Services  Medicare Certification     Admission Day 1  I certify that this patient's inpatient psychiatric hospital admission is medically necessary for:    x (1) treatment which could reasonably be expected to improve this patient's condition, or    x (2) diagnostic study or its equivalent.       Physicians Signature:  Electronically signed by Darrian Montiel MD on 9/2/20 at 6:09 PM EDT

## 2020-09-02 NOTE — GROUP NOTE
Group Therapy Note    Date: 9/2/2020    Group Start Time: 1100  Group End Time: 5828  Group Topic: Recreational    [facility]    Angel Dupree        Group Therapy Note    Attendees: 6/10      Patient's Goal:  Patients will engage in trivia and conversations that relate to trivia questions and answers. Patients were encouraged if they had a story or comments about answers/questions to share with group    Notes:  Patient attended and participated in group, having positive interactions with peers. Patient topic discussions were relative but not directly on topic. Patient was friendly and engaging    Status After Intervention:  Improved    Participation Level: Active Listener and Interactive    Participation Quality: Appropriate, Attentive and Sharing      Speech:  Slow, quiet      Thought Process/Content: Relative to topics though not directly on topic to what peers were discussing. Patient seemed to focus on some keywords in discussions and then speak based on those key words, and not the full context of discussion.       Affective Functioning: Congruent      Mood: euthymic      Level of consciousness:  Alert and Attentive      Response to Learning: Progressing to goal      Endings: None Reported    Modes of Intervention: Education, Socialization, Exploration, Clarifying, Activity and Reality-testing      Discipline Responsible: Psychoeducational Specialist      Signature:  Angel Dupree

## 2020-09-02 NOTE — CARE COORDINATION
1:1 Note:     Pt uses oxygen and a high fall risk.  1:1 monitoring for patient safety. Patient agreeable to put oxygen on after therapy group. Using oxygen in room, pleasantly talking with writer.

## 2020-09-02 NOTE — BH NOTE
Writer spoke with Nephrology, BMP and CBC ordered. MD instructed writer to call dialysis and inform them of chronic dialysis patient.

## 2020-09-02 NOTE — PROGRESS NOTES
HEMODIALYSIS POST TREATMENT NOTE    Treatment time ordered: 3 hrs    Actual treatment time: 3 hrs    UltraFiltration Goal: 2000 mL as tolerated  UltraFiltration Removed: 2000mL      Pre Treatment weight: 74.8 kg  Post Treatment weight: 72.8 kg    Estimated Dry Weight: 76.5 kg    Access used:     Central Venous Catheter: N/A     Internal Access:Right Arm AV Fistula        Access Flow: GOOD     Sign and symptoms of infection: N/A       If YES: N/A    Medications or blood products given: NONE    Regular outpatient schedule: MWF    Summary of response to treatment: Patients tolerated the dialysis Tx fair. Patients target goal met. Patient experienced HTN throughout the entire Tx. Patient remains aymptomatic-denied chest pain, SOB, HA, or dizziness. Patients Right Arm AV fistula is dressed with gauze and tape and is c,d,i/ +thrill/bruit. Patient is alert and oriented, remains HTN no s/s of distress. Explain if orders NOT met, was physician notified:N/A      ACES flowsheet faxed to patient unit/ placed in patient chart: YES    Post assessment completed: YES     Report given to: [address] Thryve documented Safety Checks include the followin) Access and face visible at all times. 2) All connections and blood lines are secure with no kinks. 3) NVL alarm engaged. 4) Hemosafe device applied (if applicable). 5) No collapse of Arterial or Venous blood chambers. 6) All blood lines / pump segments in the air detectors.

## 2020-09-02 NOTE — PLAN OF CARE
Problem: Altered Mood, Deterioration in Function:  Goal: Ability to perform activities of daily living will improve  Description: Ability to perform activities of daily living will improve  9/2/2020 1015 by Amalia Granados  Outcome: Ongoing  Patient does not perform activities of daily living without prompts and prefers to use a wheelchair instead of ambulating. Patient does however, maintains a good appetite and fair hygiene. Patient takes medication and blood sugar checks but refused vitals. Problem: Altered Mood, Deterioration in Function:  Goal: Maintenance of adequate nutrition will improve  Description: Maintenance of adequate nutrition will improve  9/2/2020 1015 by Amalia Granados  Outcome: Ongoing   Patient maintains a good appetite. Will continue to monitor. Q15 minute checks for safety.

## 2020-09-02 NOTE — BH NOTE
Patient given tobacco quitline number 45229215837 at this time, refusing to call at this time, states \" I just dont want to quit now\"- patient given information as to the dangers of long term tobacco use. Continue to reinforce the importance of tobacco cessation.

## 2020-09-02 NOTE — PROGRESS NOTES
[address]   Occupational Therapy Evaluation  Date: 20  Patient Name: Eva Monson       Room: 1703/6769-51  MRN: [id number]  Account: [de-identified]   : 1965  (53 y.o.) Gender: male     Discharge Recommendations: The patient's needs are being met with no further therapy recommended at discharge. OT Equipment Recommendations  Equipment Needed: No    Referring Practitioner: Tiesha Ruiz MD  Diagnosis: Schizoaffective disorder  Additional Pertinent Hx: Eva Monson is a 52 y/o male admitted   because of Schizoaffective Disorder. Pt comes from a nursing facility and has been reported as being argumentative with staff, refusing to take his medications and caring for himself. Patient is refusing to return back to nursing home. Assessment  Assessment: Skilled OT services not warranted at this time as pt is able to complete with set-up/supervision, which is likely baseline. There were mild balance deficits present during functional transfers, however will defer to physical therapy to address  Prognosis: Fair  Decision Making: Low Complexity  REQUIRES OT FOLLOW UP: No  No Skilled OT: At baseline function, No OT goals identified  Activity Tolerance: Patient Tolerated treatment well, Patient limited by fatigue         Past Medical History:  has a past medical history of Anemia in chronic kidney disease (CKD), Aortic valve stenosis, Chronic kidney disease, COPD (chronic obstructive pulmonary disease) ([address] Depression, Diabetes mellitus ([address] ESRD (end stage renal disease) ([address] Hyperpotassemia, Hypertension, MRSA (methicillin resistant staph aureus) culture positive, and Schizoaffective disorder ([facility] [address] Past Surgical History:   has a past surgical history that includes AV fistula repair.     Restrictions  Restrictions/Precautions: General Precautions, Up as Tolerated, Fall Risk(AV fistula right forearm, R cochlear implant, 1 : 1 supervision)  Implants present? : (cochlear implant R ear)  Other position/activity restrictions: 1:1 sitter in place upon exit/entry. Pt on dialysis 4x weekly (M,T,TH,FRI). Pt utilizes supplemental O2 during the day/at night as needed. Required Braces or Orthoses?: No     Vitals  Temp: (refused all vitals)  Pulse: 82  Resp: 14  BP: 139/67  Height: 5' 8\" (172.7 cm)  Weight: 165 lb (74.8 kg)  BMI (Calculated): 25.1  Oxygen Therapy  SpO2: (!) 89 %(O2 given)  Level of Consciousness: Alert    Subjective  Subjective: Pt admits occasional episodes of falling, last one a couple weeks ago due to right knee \"giving out\". Pt denies seizure history  Comments: RN dajuan'd pt for OT/PT evaluation. Pt seated sink-side in wheelchair completing oral care upon arrival. Pt pleasant and cooperative throughout, however difficult to understand    Overall Orientation Status: Impaired  Orientation Level: Oriented to place, Oriented to person, Disoriented to time, Disoriented to situation     Vision  Vision: Impaired  Vision Exceptions: Wears glasses for reading  Hearing  Hearing: Exceptions to [facility]  Hearing Exceptions: (deaf left ear, cochlear implant right)     Social/Functional History  Type of Home: Facility([facility] [place], lives in a facility)  ADL Assistance: Independent  Homemaking Responsibilities: No  Ambulation Assistance: Independent(completes in wheelchair. Pt reports walking short distances with walker, however spends majority of time in wheelchair)  Transfer Assistance: Independent(from bed to wheelchair)  Active : No  Occupation: On disability   Comment: Pt likely poor historian.  Pt claimed initially that he had been at the facility to 6 months, however indicated at later time that he had just started rehab program [facility]    Pain Assessment  Pain Assessment: 0-10  Pain Level: 2  Pain Type: Chronic pain  Pain Location: Knee  Pain Orientation: Right  Pain Frequency: Intermittent(increases with activity)  Functional Pain Assessment: Activities are not prevented  Non-Pharmaceutical Pain Intervention(s): Rest, Repositioned  Response to Pain Intervention: Patient Satisfied    Objective  Cognition  Overall Cognitive Status: Exceptions  Arousal/Alertness: Appropriate responses to stimuli  Following Commands: Follows one step commands consistently  Attention Span: Attends with cues to redirect  Memory: Appears intact  Safety Judgement: Decreased awareness of need for safety  Insights: Decreased awareness of deficits  Initiation: Requires cues for some  Sequencing: Requires cues for some      Sensation  Overall Sensation Status: WNL(pt denies numbness and tingling)     ADL  Feeding: Supervision  Grooming: Supervision, Setup(Pt demo'd oral and hair grooming seated sink-side in wheelchair. supervision because pt attempted to stand without locking breaks fist)  UE Bathing: Supervision, Setup  LE Bathing: Supervision, Setup  UE Dressing: Supervision  LE Dressing: Supervision  Toileting: Supervision  Additional Comments: ADLs assessed through clinical observation and reasoning unless otherwise noted.  Pt with decreased safety awareness and does not remove foot rests or lock wheelchair breaks prior to standing    UE Function  LUE Strength  Gross LUE Strength: WFL  L Hand General: 4/5  LUE Strength Comment: 4/5 gross upper extremity strength     LUE Tone: Normotonic     LUE AROM (degrees)  LUE AROM : WFL     Left Hand AROM (degrees)  Left Hand AROM: WFL  RUE Strength  Gross RUE Strength: WFL  R Hand General: 4/5  RUE Strength Comment: 4/5 gross upper extremity strength      RUE Tone: Normotonic     RUE AROM (degrees)  RUE AROM : WFL     Right Hand AROM (degrees)  Right Hand AROM: WFL    Fine Motor Skills  Coordination  Movements Are Fluid And Coordinated: Yes     Mobility  Supine to Sit: Independent  Sit to Supine: Independent       Balance  Sitting Balance: Modified independent (intermittent BUE support on bedside for stability)  Standing Balance: Stand by assistance Functional Mobility  Functional - Mobility Device: Wheelchair  Activity: Other  Assist Level: Modified independent   Functional Mobility Comments: Pt completes functional mobility at wheelchair level primarily. Pt demo'd ability to propel wheelchair through tight spaces, turning, and down hallway. Bed mobility  Rolling to Left: Independent  Rolling to Right: Independent  Supine to Sit: Independent  Sit to Supine: Independent  Scooting: Independent  Comment: Pt demo'd appropriate technique. No cueing provided for safety awareness. Transfers  Stand Pivot Transfers: Stand by assistance  Sit to stand: Stand by assistance  Stand to sit: Stand by assistance  Transfer Comments: Pt placed walker perpendicular to bed in preparation for transfers. Pt reports typically completing stand pivot transfer at an angle. Writer provided wheelchair adjustment.  No loss of balance    Functional Outcome Measures  AM-PAC Daily Activity Inpatient   How much help for putting on and taking off regular lower body clothing?: A Little  How much help for Bathing?: A Little  How much help for Toileting?: A Little  How much help for putting on and taking off regular upper body clothing?: A Little  How much help for taking care of personal grooming?: A Little  How much help for eating meals?: None  [facility] Inpatient Daily Activity Raw Score: 19  AM-PAC Inpatient ADL T-Scale Score : 40.22  ADL Inpatient CMS 0-100% Score: 42.8  ADL Inpatient CMS G-Code Modifier : CK    Plan  Safety Devices  Safety Devices in place: Yes  Type of devices: Left in chair, Nurse notified, Patient at risk for falls(Pt left with 1:1 sitter upon exit)  Restraints  Initially in place: Yes  Restraints: 1:1 sitter        OT Education  OT Education: OT Role, Plan of Care, Precautions, Transfer Training, Energy Conservation  Patient Education: see relevant sections for education details  Barriers to Learning: cognitive deficits at baseline    OT Equipment Recommendations  Equipment Needed: No  OT Individual Minutes  Time In: 1330  Time Out: 5276  Minutes: 22    Electronically signed by Krystian Lange OT on 9/2/20 at 2:33 PM EDT

## 2020-09-02 NOTE — PROGRESS NOTES
Schizoaffective disorder ([facility] [address] has a past surgical history that includes AV fistula repair. Restrictions  Restrictions/Precautions  Restrictions/Precautions: General Precautions, Up as Tolerated, Fall Risk(AV fistula right forearm, R cochlear implant, 1 : 1 supervision)  Required Braces or Orthoses?: No  Implants present? : (cochlear implant R ear)  Position Activity Restriction  Other position/activity restrictions: 1:1 sitter in place upon exit/entry. Pt on dialysis 4x weekly (M,T,TH,FRI). Pt utilizes supplemental O2 during the day/at night as needed. Vision/Hearing  Vision: Impaired  Vision Exceptions: Wears glasses for reading  Hearing: Exceptions to [facility]  Hearing Exceptions: (deaf left ear, cochlear implant right)     Subjective  General  Chart Reviewed: Yes  Patient assessed for rehabilitation services?: Yes  Additional Pertinent Hx: Bentley Viramontes is 53 y.o.,  male, admitted because of Schizoaffective Disorder. According to ED notes, pt comes from a nursing facility and has been reported as being argumentative with staff, refusing to take his medications and unable to care  for himself. Patient is refusing to return back to nursing home. Patient not answering questions appropriately. Pt is in a wheelchair and is on 1:1 watch for safety. Patient has hx of ESRD and reports in on dialysis 4 times a week. Response To Previous Treatment: Not applicable  Family / Caregiver Present: No  Referring Practitioner: Dr. Madelin Pepe  Referral Date : 09/02/20  Diagnosis: schizoaffective disorder  Follows Commands: Impaired  Other (Comment): OK per nursing staff to proceed w/ PT evaluation  General Comment  Comments: pt observed sitting in the bathroom at the sink brushing his teeth and combing his hair when the therapists entered the room. Subjective  Subjective: pt reports that he became angry during dialysis when the dialysis refused to stop the treatment 1/2 hour early.   Pt had C/O cramping in the right hand during dialysis and wanted to stop. Pain Screening  Patient Currently in Pain: Yes  Pain Assessment  Pain Assessment: 0-10  Pain Level: 2  Pain Type: Chronic pain  Pain Location: Knee  Pain Orientation: Right  Pain Frequency: Intermittent(increases w/ activity and weather changes)  Pain Onset: On-going  Functional Pain Assessment: Activities are not prevented  Non-Pharmaceutical Pain Intervention(s): Repositioned  Response to Pain Intervention: Patient Satisfied  Multiple Pain Sites: No  Vital Signs  Patient Currently in Pain: Yes       Orientation  Orientation  Overall Orientation Status: Impaired  Orientation Level: Oriented to person;Oriented to place; Disoriented to situation;Disoriented to time  Social/Functional History  Social/Functional History  Type of Home: Facility([place], lives in a facility)  Home Access: Level entry  Cornelio Rose Help From: Personal care attendant  ADL Assistance: [address]: Needs assistance(staff completes)  Homemaking Responsibilities: No  Ambulation Assistance: Needs assistance(independent wheelchair mobility. Pt reports walking short distances with walker w/ therapy staff assisting, however spends majority of time in wheelchair)  Transfer Assistance: Independent(from bed to wheelchair)  Active : No  Occupation: On disability  Additional Comments: pt is a poor historian, ? reliability of above information as he often changes his answers, no family present to verify information. Pt reports that he receives dialysis at [facility] 4 days/ week. Pt reports that he receives physical therapy and practices walking w/ wheeled  walker w/ therapy staff. Cognition   Cognition  Overall Cognitive Status: Exceptions  Arousal/Alertness: Appropriate responses to stimuli  Following Commands:  Follows one step commands consistently  Attention Span: Attends with cues to redirect  Memory: Appears intact  Safety Judgement: Decreased awareness of need for safety  Insights: Decreased awareness of deficits  Initiation: Requires cues for some  Sequencing: Requires cues for some    Objective     Observation/Palpation  Observation: AV fistula right forearm, R cochlear implant, 1 : 1 supervision    AROM RLE (degrees)  RLE AROM: WFL  RLE General AROM: except lacks 10 degrees terminal knee extension  AROM LLE (degrees)  LLE AROM : WFL  AROM RUE (degrees)  RUE General AROM: see OT for UE assessment  AROM LUE (degrees)  LUE General AROM: see OT for UE assessment  Strength RLE  Comment: right hip flexors and right quad 3+/5 otherwise 4-/5  Strength LLE  Comment: left hip flexors 3+/5 otherwise 4-/5  Strength RUE  Comment: see OT for UE assessment  Strength LUE  Comment: see OT for UE assessment     Sensation  Overall Sensation Status: WNL(pt denies numbness and tingling)  Bed mobility  Rolling to Left: Independent  Rolling to Right: Independent  Supine to Sit: Independent  Sit to Supine: Independent  Scooting: Independent  Comment: Pt demo'd appropriate technique. No cueing provided for safety awareness. Transfers  Sit to Stand: Stand by assistance  Stand to sit: Stand by assistance  Bed to Chair: Stand by assistance(demonstrated W/C > bed > W/C w/ SBA)  Comment: pt performed W/C > bed  w/ SBA w/ W/C positioned perpendicular to the bed. Pt took 4 short shuffling steps to turn completely around and sit down on the bed. FALL RISK. W/C was repositioned so pt only had to do a quarter turn from bed > W/C ahen returning at the end of treatment. Pt demonstrates safer technique and balance doing quarter turn than full turn. Ambulation  Ambulation?: No  Stairs/Curb  Stairs?: No  Wheelchair Activities  Propulsion: Yes  Propulsion 1  Propulsion: Manual  Level: Level Tile  Method: RUE;LUE  Level of Assistance: Independent  Description/ Details: pt able to turn the chair completely around and steer around objects in the hallway (2 different poles) safely;  Pt remembered to set the brakes Concurrent Group Co-treatment   Time In 1330         Time Out 1352         Minutes 25                 Diana Cordero

## 2020-09-03 LAB
CHOLESTEROL, FASTING: 156 MG/DL
CHOLESTEROL/HDL RATIO: 2.7
ESTIMATED AVERAGE GLUCOSE: 117 MG/DL
GLUCOSE BLD-MCNC: 107 MG/DL (ref 75–110)
GLUCOSE BLD-MCNC: 89 MG/DL (ref 75–110)
GLUCOSE BLD-MCNC: 92 MG/DL (ref 75–110)
HBA1C MFR BLD: 5.7 % (ref 4–6)
HDLC SERPL-MCNC: 57 MG/DL
LDL CHOLESTEROL: 79 MG/DL (ref 0–130)
TRIGLYCERIDE, FASTING: 101 MG/DL
URIC ACID: 4.6 MG/DL (ref 3.4–7)
VLDLC SERPL CALC-MCNC: NORMAL MG/DL (ref 1–30)

## 2020-09-03 PROCEDURE — 6370000000 HC RX 637 (ALT 250 FOR IP): Performed by: NURSE PRACTITIONER

## 2020-09-03 PROCEDURE — 6370000000 HC RX 637 (ALT 250 FOR IP): Performed by: PSYCHIATRY & NEUROLOGY

## 2020-09-03 PROCEDURE — 82947 ASSAY GLUCOSE BLOOD QUANT: CPT

## 2020-09-03 PROCEDURE — 99232 SBSQ HOSP IP/OBS MODERATE 35: CPT | Performed by: PSYCHIATRY & NEUROLOGY

## 2020-09-03 PROCEDURE — 36415 COLL VENOUS BLD VENIPUNCTURE: CPT

## 2020-09-03 PROCEDURE — 94760 N-INVAS EAR/PLS OXIMETRY 1: CPT

## 2020-09-03 PROCEDURE — 6370000000 HC RX 637 (ALT 250 FOR IP): Performed by: INTERNAL MEDICINE

## 2020-09-03 PROCEDURE — 90833 PSYTX W PT W E/M 30 MIN: CPT | Performed by: PSYCHIATRY & NEUROLOGY

## 2020-09-03 PROCEDURE — 80061 LIPID PANEL: CPT

## 2020-09-03 PROCEDURE — 83036 HEMOGLOBIN GLYCOSYLATED A1C: CPT

## 2020-09-03 PROCEDURE — 94640 AIRWAY INHALATION TREATMENT: CPT

## 2020-09-03 PROCEDURE — 84550 ASSAY OF BLOOD/URIC ACID: CPT

## 2020-09-03 PROCEDURE — 1240000000 HC EMOTIONAL WELLNESS R&B

## 2020-09-03 RX ORDER — SERTRALINE HYDROCHLORIDE 25 MG/1
25 TABLET, FILM COATED ORAL DAILY
Status: DISCONTINUED | OUTPATIENT
Start: 2020-09-04 | End: 2020-09-08 | Stop reason: HOSPADM

## 2020-09-03 RX ADMIN — TRAZODONE HYDROCHLORIDE 50 MG: 50 TABLET ORAL at 00:15

## 2020-09-03 RX ADMIN — SEVELAMER CARBONATE 4000 MG: 800 TABLET, FILM COATED ORAL at 12:14

## 2020-09-03 RX ADMIN — SEVELAMER CARBONATE 4000 MG: 800 TABLET, FILM COATED ORAL at 17:23

## 2020-09-03 RX ADMIN — DIPHENHYDRAMINE HCL 25 MG: 25 TABLET ORAL at 06:27

## 2020-09-03 RX ADMIN — DIPHENHYDRAMINE HCL 25 MG: 25 TABLET ORAL at 20:09

## 2020-09-03 RX ADMIN — MONTELUKAST 10 MG: 10 TABLET, FILM COATED ORAL at 21:30

## 2020-09-03 RX ADMIN — ROFLUMILAST 500 MCG: 500 TABLET ORAL at 08:29

## 2020-09-03 RX ADMIN — DIPHENHYDRAMINE HCL 25 MG: 25 TABLET ORAL at 13:51

## 2020-09-03 RX ADMIN — TAMSULOSIN HYDROCHLORIDE 0.4 MG: 0.4 CAPSULE ORAL at 08:30

## 2020-09-03 RX ADMIN — TRAZODONE HYDROCHLORIDE 50 MG: 50 TABLET ORAL at 21:30

## 2020-09-03 RX ADMIN — AMLODIPINE BESYLATE 10 MG: 10 TABLET ORAL at 21:30

## 2020-09-03 RX ADMIN — IPRATROPIUM BROMIDE AND ALBUTEROL SULFATE 1 AMPULE: 2.5; .5 SOLUTION RESPIRATORY (INHALATION) at 16:41

## 2020-09-03 RX ADMIN — SEVELAMER CARBONATE 4000 MG: 800 TABLET, FILM COATED ORAL at 08:29

## 2020-09-03 RX ADMIN — ACETAMINOPHEN 650 MG: 325 TABLET, FILM COATED ORAL at 00:11

## 2020-09-03 ASSESSMENT — PAIN SCALES - GENERAL
PAINLEVEL_OUTOF10: 0
PAINLEVEL_OUTOF10: 0
PAINLEVEL_OUTOF10: 3

## 2020-09-03 NOTE — PLAN OF CARE
Problem: Altered Mood, Deterioration in Function:  Goal: Ability to perform activities of daily living will improve  Description: Ability to perform activities of daily living will improve  9/3/2020 0741 by Truman Ирниа  Outcome: Ongoing  Patient is pleasant and cooperative. Denies any depression or anxiety. Denies any auditory or visual hallucinations. Denies any suicidal or homicidal ideation. Medication compliant. Q15 minute safety checks maintained. 1:1 continued for fall risk. Problem: Altered Mood, Deterioration in Function:  Goal: Maintenance of adequate nutrition will improve  Description: Maintenance of adequate nutrition will improve  9/3/2020 0741 by Truman Ирина  Outcome: Ongoing     Problem: Falls - Risk of:  Goal: Will remain free from falls  Description: Will remain free from falls  9/3/2020 0741 by Truman Ирина  Outcome: Ongoing  Patient remains free from falls at this time. Problem: Pain:  Goal: Pain level will decrease  Description: Pain level will decrease  9/3/2020 0741 by Truman Ирина  Outcome: Ongoing  Patient denies any pain at this time.

## 2020-09-03 NOTE — PLAN OF CARE
[facility]  Day 3 Interdisciplinary Treatment Plan NOTE    Review Date & Time: 9/3/2020  1330     Admission Type:   Admission Type: Involuntary    Reason for admission:  Reason for Admission: Pt agitated and argumentative. Pt unable to care for self, but refusing to go back to nursing facility. Pt not taking medications correctly.   Estimated Length of Stay: 5-7 days  Estimated Discharge Date Update: to be determined by physician    PATIENT STRENGTHS:  Patient Strengths Strengths: Communication, Positive Support  Patient Strengths and Limitations:Limitations: Perceives need for assistance with self-care, Multiple barriers to leisure interests, Difficulty problem solving/relies on others to help solve problems, Unrealistic self-view  Addictive Behavior:Addictive Behavior  In the past 3 months, have you felt or has someone told you that you have a problem with:  : None  Do you have a history of Chemical Use?: No  Do you have a history of Alcohol Use?: No  Do you have a history of Street Drug Abuse?: No  Histroy of Prescripton Drug Abuse?: No  Medical Problems:  Past Medical History:   Diagnosis Date    Anemia in chronic kidney disease (CKD)     Aortic valve stenosis     Chronic kidney disease     dialysis pt    COPD (chronic obstructive pulmonary disease) (HCC)     Depression     Diabetes mellitus ([facility] [address]     ESRD (end stage renal disease) ([facility] [address]     Hyperpotassemia     Hypertension     MRSA (methicillin resistant staph aureus) culture positive 04/05/2011    thigh    Schizoaffective disorder ([facility] [address]        Risk:  Fall RiskTotal: 78  Efrain Scale Efrain Scale Score: 18  BVC Total: 0  Change in scores no Changes to plan of Care no    Status EXAM:   Status and Exam  Normal: Yes  Facial Expression: Brightened  Affect: Blunt  Level of Consciousness: Alert  Mood:Normal: Yes  Mood: Anxious  Motor Activity:Normal: Yes  Motor Activity: Increased  Interview Behavior: Cooperative  Preception: Calvin to Person, Time frame for Short-Term Goals: 5-7 days    LONG-TERM GOALS UPDATE:   Time frame for Long-Term Goals: 6 months  Members Present in Team Meeting: See Signature Sheet    JONATHON Velásquez

## 2020-09-03 NOTE — GROUP NOTE
Group Therapy Note    Date: 9/3/2020    Group Start Time: 2289  Group End Time: 0914  Group Topic: Healthy Living/Wellness    SUE MARTINI D Rusty Nageotte, JAZS    Pt did not attend [phone] skills group d/t resting in room despite staff invitation to attend. 1:1 talk time offered as alternative to group session, pt declined.               Signature:  Reji Seaman

## 2020-09-03 NOTE — PROGRESS NOTES
Gave pt duoneb tx spo2 when entering room was 88% did increase to 92% when pt started talking, pt is suppose to be on 2lnc however he keeps refusing to put it on. RN in room and is keeping watch over pt.  Pt does not appear to be in any respiratory distress at this time

## 2020-09-03 NOTE — PROGRESS NOTES
Nephrology ESRD Progress Note    Reason for Consult:  End stage renal disease  Requesting Physician:       History of Present Illness: This is a 53 y.o. male with COPD, Type 2 diabetes mellitus, hypertension, end stage renal disease on hemodialysis MWF  who presents with Schizoaffective disorder. Patient is resident at a rehab facility in [facility] and does dialysis in-house at the facility. Apparently he had been refusing to take medications and and refusing caring for himself and was confused and argumentative. Patient denies shortness of breath, cough, fever nausea vomiting. Subjective/Interval hx    Patient seen and examined, no new complains today. No fever chills.     Past Medical History:        Diagnosis Date    Anemia in chronic kidney disease (CKD)     Aortic valve stenosis     Chronic kidney disease     dialysis pt    [facility] disease) [facility])     Depression     Diabetes mellitus (HCC)     ESRD (end stage renal disease) ([facility] [address]     Hyperpotassemia     Hypertension     MRSA (methicillin resistant staph aureus) culture positive 04/05/2011    thigh    Schizoaffective disorder (HCC)            Past Surgical History:        Procedure Laterality Date    AV FISTULA REPAIR         Current Medications:    glucose (GLUTOSE) 40 % oral gel 15 g, PRN  dextrose 50 % IV solution, PRN  glucagon (rDNA) injection 1 mg, PRN  dextrose 5 % solution, PRN  ipratropium-albuterol (DUONEB) nebulizer solution 1 ampule, Q4H WA  albuterol (PROVENTIL) nebulizer solution 2.5 mg, Q6H PRN  diphenhydrAMINE (BENADRYL) tablet 25 mg, Q6H PRN  insulin lispro (HUMALOG) injection vial 0-6 Units, TID WC  insulin lispro (HUMALOG) injection vial 0-3 Units, Nightly  allopurinol (ZYLOPRIM) tablet 100 mg, Daily  amLODIPine (NORVASC) tablet 10 mg, Nightly  calcitRIOL (ROCALTROL) capsule 0.25 mcg, Once per day on Mon Wed Fri  sevelamer (RENVELA) tablet 4,000 mg, TID WC  Roflumilast (DALIRESP) tablet 500 mcg, Daily  tamsulosin (FLOMAX) capsule 0.4 mg, Daily  budesonide-formoterol (SYMBICORT) 160-4.5 MCG/ACT inhaler 2 puff, Q12H  montelukast (SINGULAIR) tablet 10 mg, Nightly  acetaminophen (TYLENOL) tablet 650 mg, Q4H PRN  aluminum & magnesium hydroxide-simethicone (MAALOX) 200-200-20 MG/5ML suspension 30 mL, Q6H PRN  diphenhydrAMINE (BENADRYL) injection 50 mg, Q4H PRN  haloperidol lactate (HALDOL) injection 5 mg, Q4H PRN  haloperidol (HALDOL) tablet 5 mg, Q4H PRN  hydrOXYzine (ATARAX) tablet 50 mg, TID PRN  ibuprofen (ADVIL;MOTRIN) tablet 400 mg, Q6H PRN  LORazepam (ATIVAN) injection 2 mg, Q4H PRN  polyethylene glycol (GLYCOLAX) packet 17 g, Daily PRN  traZODone (DESYREL) tablet 50 mg, Nightly PRN  benzocaine-menthol (CEPACOL SORE THROAT) lozenge 1 lozenge, Q2H PRN  LORazepam (ATIVAN) tablet 2 mg, Q4H PRN        Allergies:  Penicillins  Objective:  Constitutional:    CURRENT TEMPERATURE:  Temp: 98.1 °F (36.7 °C)  MAXIMUM TEMPERATURE OVER 24HRS:  Temp (24hrs), Av.2 °F (36.8 °C), Min:98.1 °F (36.7 °C), Max:98.2 °F (36.8 °C)    CURRENT RESPIRATORY RATE:  Resp: 16  CURRENT PULSE:  Pulse: 84  CURRENT BLOOD PRESSURE:  BP: 119/73  24HR BLOOD PRESSURE RANGE:  Systolic (21FZG), GTE:611 , Min:119 , YFX:894   ; Diastolic (45HRQ), DNJ:94, Min:50, Max:95    24HR INTAKE/OUTPUT:  No intake or output data in the 24 hours ending 20 2037        Physical Exam:  GENERAL APPEARANCE: Alert and cooperative, and appears to be in no acute distress. HEAD: normocephalic  EYES: PERRL, EOMI. Not pale, anicteric   NOSE:  No nasal discharge. THROAT:  Oral cavity and pharynx normal. Moist  NECK: Neck supple, non-tender without lymphadenopathy, masses or thyromegaly. JVD-neg  CARDIAC: Normal S1 and S2. No S3, S4 or murmurs. Rhythm is regular. LUNGS: Clear to auscultation and percussion without rales, rhonchi, wheezing or diminished breath sounds.   ABD-Soft non distended, BS+ Non tender no organomegally  BACK: Examination of the spine reveals  no spinal deformity, without tenderness,   MUSKULOSKELETAL: Adequately aligned spine. No joint erythema or tenderness. EXTREMITIES: No edema. Peripheral pulses intact. NEURO:Alert oriented x 3 ,power 5/5 in all extremities  AVF right ARM , +VE Bruit and thrill. Labs:  PTH:  No results found for: PTH  abs:   CBC:   Recent Labs     09/02/20 2031   WBC 7.2   RBC 3.63*   HGB 12.2*   HCT 36.7*   .1*   MCH 33.7   MCHC 33.3   RDW 14.3      MPV 7.1      BMP:   Recent Labs     09/02/20 2031   *   K 4.1   CL 90*   CO2 31   BUN 34*   CREATININE 5.17*   GLUCOSE 184*   CALCIUM 8.6        Phosphorus:    Recent Labs     09/02/20 2031   PHOS 3.9     Magnesium: No results for input(s): MG in the last 72 hours. Albumin: No results for input(s): LABALBU in the last 72 hours. Assessment:  1. ESRD Secondary to diabetic nephropathyon hemodialysis Monday Wednesday Friday by way of a right arm AV fistula. His dry weight is 76.5 kg.    2. Essential hypertension-Blood pressure uncontrolled but should improve with dialysis. Continue amlodipine 10 mg daily    3. Secondary hyperparathyroidism-Check serum phosphorus and PTH- Calcitrol 0.25 mics 3 times weekly    4. Anemia of CKD-We will check a CBC. 5.Type 2 diabetes mellitus    7. Hyperphosphatemia related to ESRD-Resume Renvela 4000 mg with meals and         Percy Bray MD    Nephrologist    Thank you for the consultation.

## 2020-09-03 NOTE — GROUP NOTE
Group Therapy Note    Date: 9/3/2020    Group Start Time: 1100  Group End Time: 1130  Group Topic: Cognitive Skills    STCZ NKECHI Garcia    Pt did not attend 1100 skills group d/t resting in room despite staff invitation to attend. 1:1 talk time offered as alternative to group session, pt declined.                 Signature:  Alexandrea Paulino

## 2020-09-03 NOTE — GROUP NOTE
Group Therapy Note    Date: 9/3/2020    Group Start Time: 0915  Group End Time: 0930  Group Topic: Community Meeting    SUE Johnson        Group Therapy Note    Pt did not attend community skills group d/t resting in room despite staff invitation to attend. 1:1 talk time offered as alternative to group session, pt declined.

## 2020-09-03 NOTE — PLAN OF CARE
Problem: Altered Mood, Deterioration in Function:  Goal: Ability to perform activities of daily living will improve  Description: Ability to perform activities of daily living will improve  9/2/2020 2333 by Gardner Cushing, RN  Outcome: Ongoing   Pt able to perform ADLs with minimal assist  Problem: Altered Mood, Deterioration in Function:  Goal: Maintenance of adequate nutrition will improve  Description: Maintenance of adequate nutrition will improve  9/2/2020 2333 by Gardner Cushing, RN  Outcome: Ongoing   Pt tolerates dinner and snack well  Problem: Falls - Risk of:  Goal: Will remain free from falls  Description: Will remain free from falls  Outcome: Ongoing   Pt free from falls this shift. Problem: Tobacco Use:  Goal: Inpatient tobacco use cessation counseling participation  Description: Inpatient tobacco use cessation counseling participation  Outcome: Ongoing   Pt declines counseling   Problem: Pain:  Goal: Pain level will decrease  Description: Pain level will decrease  Outcome: Ongoing   Pt denies pain.

## 2020-09-03 NOTE — GROUP NOTE
Group Therapy Note    Date: 9/3/2020    Group Start Time: 1600  Group End Time: 36  Group Topic: Group Documentation    [facility] BHI D    Rico Beaulieu RN        Group Therapy Note    Attendees:          Patient's Goal:  Facing anxiety    Notes:  New ways to deal with anxiety    Status After Intervention:  Unchanged    Participation Level: Minimal    Participation Quality: Lethargic      Speech:  normal      Thought Process/Content: Logical      Affective Functioning: Congruent      Mood: depressed      Level of consciousness:  Drowsy      Response to Learning: Able to retain information      Endings: None Reported    Modes of Intervention: Education      Discipline Responsible: Registered Nurse      Signature:  Rico Beaulieu RN

## 2020-09-04 LAB
ANION GAP SERPL CALCULATED.3IONS-SCNC: 16 MMOL/L (ref 9–17)
BUN BLDV-MCNC: 60 MG/DL (ref 6–20)
BUN/CREAT BLD: ABNORMAL (ref 9–20)
CALCIUM SERPL-MCNC: 9.5 MG/DL (ref 8.6–10.4)
CHLORIDE BLD-SCNC: 90 MMOL/L (ref 98–107)
CO2: 26 MMOL/L (ref 20–31)
CREAT SERPL-MCNC: 7.69 MG/DL (ref 0.7–1.2)
GFR AFRICAN AMERICAN: 9 ML/MIN
GFR NON-AFRICAN AMERICAN: 7 ML/MIN
GFR SERPL CREATININE-BSD FRML MDRD: ABNORMAL ML/MIN/{1.73_M2}
GFR SERPL CREATININE-BSD FRML MDRD: ABNORMAL ML/MIN/{1.73_M2}
GLUCOSE BLD-MCNC: 102 MG/DL (ref 75–110)
GLUCOSE BLD-MCNC: 107 MG/DL (ref 75–110)
GLUCOSE BLD-MCNC: 118 MG/DL (ref 75–110)
GLUCOSE BLD-MCNC: 122 MG/DL (ref 70–99)
GLUCOSE BLD-MCNC: 203 MG/DL (ref 75–110)
POTASSIUM SERPL-SCNC: 5.6 MMOL/L (ref 3.7–5.3)
SODIUM BLD-SCNC: 132 MMOL/L (ref 135–144)

## 2020-09-04 PROCEDURE — 36415 COLL VENOUS BLD VENIPUNCTURE: CPT

## 2020-09-04 PROCEDURE — 6370000000 HC RX 637 (ALT 250 FOR IP): Performed by: NURSE PRACTITIONER

## 2020-09-04 PROCEDURE — 6370000000 HC RX 637 (ALT 250 FOR IP): Performed by: INTERNAL MEDICINE

## 2020-09-04 PROCEDURE — 6370000000 HC RX 637 (ALT 250 FOR IP): Performed by: PSYCHIATRY & NEUROLOGY

## 2020-09-04 PROCEDURE — 94761 N-INVAS EAR/PLS OXIMETRY MLT: CPT

## 2020-09-04 PROCEDURE — 99232 SBSQ HOSP IP/OBS MODERATE 35: CPT | Performed by: PSYCHIATRY & NEUROLOGY

## 2020-09-04 PROCEDURE — 90935 HEMODIALYSIS ONE EVALUATION: CPT

## 2020-09-04 PROCEDURE — 94760 N-INVAS EAR/PLS OXIMETRY 1: CPT

## 2020-09-04 PROCEDURE — 6360000002 HC RX W HCPCS: Performed by: INTERNAL MEDICINE

## 2020-09-04 PROCEDURE — 1240000000 HC EMOTIONAL WELLNESS R&B

## 2020-09-04 PROCEDURE — 94640 AIRWAY INHALATION TREATMENT: CPT

## 2020-09-04 PROCEDURE — 82947 ASSAY GLUCOSE BLOOD QUANT: CPT

## 2020-09-04 PROCEDURE — 80048 BASIC METABOLIC PNL TOTAL CA: CPT

## 2020-09-04 PROCEDURE — 90833 PSYTX W PT W E/M 30 MIN: CPT | Performed by: PSYCHIATRY & NEUROLOGY

## 2020-09-04 RX ORDER — DIPHENHYDRAMINE HYDROCHLORIDE 50 MG/ML
25 INJECTION INTRAMUSCULAR; INTRAVENOUS ONCE
Status: COMPLETED | OUTPATIENT
Start: 2020-09-04 | End: 2020-09-04

## 2020-09-04 RX ADMIN — SERTRALINE HYDROCHLORIDE 25 MG: 25 TABLET ORAL at 09:03

## 2020-09-04 RX ADMIN — ROFLUMILAST 500 MCG: 500 TABLET ORAL at 09:03

## 2020-09-04 RX ADMIN — IPRATROPIUM BROMIDE AND ALBUTEROL SULFATE 1 AMPULE: 2.5; .5 SOLUTION RESPIRATORY (INHALATION) at 16:28

## 2020-09-04 RX ADMIN — IPRATROPIUM BROMIDE AND ALBUTEROL SULFATE 1 AMPULE: 2.5; .5 SOLUTION RESPIRATORY (INHALATION) at 08:55

## 2020-09-04 RX ADMIN — MONTELUKAST 10 MG: 10 TABLET, FILM COATED ORAL at 21:49

## 2020-09-04 RX ADMIN — BUDESONIDE AND FORMOTEROL FUMARATE DIHYDRATE 2 PUFF: 160; 4.5 AEROSOL RESPIRATORY (INHALATION) at 08:56

## 2020-09-04 RX ADMIN — INSULIN LISPRO 1 UNITS: 100 INJECTION, SOLUTION INTRAVENOUS; SUBCUTANEOUS at 21:49

## 2020-09-04 RX ADMIN — DIPHENHYDRAMINE HYDROCHLORIDE 25 MG: 50 INJECTION INTRAMUSCULAR; INTRAVENOUS at 15:56

## 2020-09-04 RX ADMIN — IPRATROPIUM BROMIDE AND ALBUTEROL SULFATE 1 AMPULE: 2.5; .5 SOLUTION RESPIRATORY (INHALATION) at 20:36

## 2020-09-04 RX ADMIN — AMLODIPINE BESYLATE 10 MG: 10 TABLET ORAL at 21:48

## 2020-09-04 RX ADMIN — DIPHENHYDRAMINE HCL 25 MG: 25 TABLET ORAL at 09:04

## 2020-09-04 RX ADMIN — Medication 1 LOZENGE: at 18:12

## 2020-09-04 RX ADMIN — CALCITRIOL 0.25 MCG: 0.25 CAPSULE ORAL at 09:03

## 2020-09-04 RX ADMIN — SEVELAMER CARBONATE 4000 MG: 800 TABLET, FILM COATED ORAL at 08:10

## 2020-09-04 RX ADMIN — ALLOPURINOL 100 MG: 100 TABLET ORAL at 09:03

## 2020-09-04 RX ADMIN — ACETAMINOPHEN 650 MG: 325 TABLET, FILM COATED ORAL at 21:48

## 2020-09-04 RX ADMIN — IPRATROPIUM BROMIDE AND ALBUTEROL SULFATE 1 AMPULE: 2.5; .5 SOLUTION RESPIRATORY (INHALATION) at 12:06

## 2020-09-04 RX ADMIN — TAMSULOSIN HYDROCHLORIDE 0.4 MG: 0.4 CAPSULE ORAL at 09:03

## 2020-09-04 RX ADMIN — ACETAMINOPHEN 650 MG: 325 TABLET, FILM COATED ORAL at 13:00

## 2020-09-04 ASSESSMENT — PAIN DESCRIPTION - PAIN TYPE
TYPE: OTHER (COMMENT)
TYPE: OTHER (COMMENT)

## 2020-09-04 ASSESSMENT — PAIN SCALES - GENERAL
PAINLEVEL_OUTOF10: 3
PAINLEVEL_OUTOF10: 5
PAINLEVEL_OUTOF10: 0
PAINLEVEL_OUTOF10: 3
PAINLEVEL_OUTOF10: 0
PAINLEVEL_OUTOF10: 0

## 2020-09-04 ASSESSMENT — PAIN DESCRIPTION - LOCATION
LOCATION: GENERALIZED
LOCATION: GENERALIZED

## 2020-09-04 NOTE — PROGRESS NOTES
Daily Progress Note  Darrian Montiel MD  9/3/2020  CHIEF COMPLAINT:  agitation    Reviewed patient's current plan of care and vital signs with nursing staff. Sleep:  7 hours last night  Attending groups: Yes    SUBJECTIVE:    Patient continues to have hard time hearing. Staff mentions that he is refusing to get on his continuous oxygen at times at night. Reports that he is feeling anxious this morning. Continues to be on a one-to-one sitter because of the continuous oxygen. Does not show any signs of aggression or agitation here on the unit. At this point this might be a disposition issue as he does not want to go back to [facility]. Discussed with social work to have a discussion with his guardian about the places he can go back to that can support dialysis. Mental Status Exam  Level of consciousness:  Within normal limits  Appearance: Hospital attire, seated in wheel chair, with good grooming and hygiene   Behavior/Motor: No abnormalities noted  Attitude toward examiner:  Cooperative, attentive, good eye contact  Speech:  spontaneous, normal rate, normal volume and well articulated  Mood:  euthymic  Affect: congruent with mood  Thought processes:  linear, goal directed and coherent  Thought content:  denies homicidal ideation  Suicidal Ideation: denies suicidal ideation  Delusions:  no evidence of delusions  Perceptual Disturbance:  denies any perceptual disturbance  Cognition:  Oriented to self, location, time, and situation  Memory: age appropriate  Insight & Judgement: improving  Medication side effects:  denies       Data   height is 5' 8\" (1.727 m) and weight is 165 lb (74.8 kg). His oral temperature is 98 °F (36.7 °C). His blood pressure is 186/72 (abnormal) and his pulse is 89. His respiration is 14 and oxygen saturation is 95%.    Labs:   Admission on 09/01/2020   Component Date Value Ref Range Status    POC Glucose 09/02/2020 98  75 - 110 mg/dL Final    POC Glucose 09/02/2020 73* 75 - 110 mg/dL Final    Hemoglobin A1C 09/03/2020 5.7  4.0 - 6.0 % Final    Estimated Avg Glucose 09/03/2020 117  mg/dL Final    Comment: The ADA and AACC recommend providing the estimated average glucose result to permit better   patient understanding of their HBA1c result.  Cholesterol, Fasting 09/03/2020 156  <200 mg/dL Final    Comment:    Cholesterol Guidelines:      <200  Desirable   200-240  Borderline      >240  Undesirable         HDL 09/03/2020 57  >40 mg/dL Final    Comment:    HDL Guidelines:    <40     Undesirable   40-59    Borderline    >59     Desirable         LDL Cholesterol 09/03/2020 79  0 - 130 mg/dL Final    Comment:    LDL Guidelines:     <100    Desirable   100-129   Near to/above Desirable   130-159   Borderline      >159   Undesirable     Direct (measured) LDL and calculated LDL are not interchangeable tests.  Chol/HDL Ratio 09/03/2020 2.7  <5 Final            Triglyceride, Fasting 09/03/2020 101  <150 mg/dL Final    Comment:    Triglyceride Guidelines:     <150   Desirable   150-199  Borderline   200-499  High     >499   Very high   Based on AHA Guidelines for fasting triglyceride, October 2012.          VLDL 09/03/2020 NOT REPORTED  1 - 30 mg/dL Final    Uric Acid 09/03/2020 4.6  3.4 - 7.0 mg/dL Final    Glucose 09/02/2020 184* 70 - 99 mg/dL Final    BUN 09/02/2020 34* 6 - 20 mg/dL Final    CREATININE 09/02/2020 5.17* 0.70 - 1.20 mg/dL Final    Bun/Cre Ratio 09/02/2020 NOT REPORTED  9 - 20 Final    Calcium 09/02/2020 8.6  8.6 - 10.4 mg/dL Final    Sodium 09/02/2020 134* 135 - 144 mmol/L Final    Potassium 09/02/2020 4.1  3.7 - 5.3 mmol/L Final    Chloride 09/02/2020 90* 98 - 107 mmol/L Final    CO2 09/02/2020 31  20 - 31 mmol/L Final    Anion Gap 09/02/2020 13  9 - 17 mmol/L Final    GFR Non- 09/02/2020 12* >60 mL/min Final    GFR  09/02/2020 14* >60 mL/min Final    GFR Comment 09/02/2020        Final    Comment: Average GFR for POC Glucose 09/03/2020 92  75 - 110 mg/dL Final            Medications  Current Facility-Administered Medications: glucose (GLUTOSE) 40 % oral gel 15 g, 15 g, Oral, PRN  dextrose 50 % IV solution, 12.5 g, Intravenous, PRN  glucagon (rDNA) injection 1 mg, 1 mg, Intramuscular, PRN  dextrose 5 % solution, 100 mL/hr, Intravenous, PRN  ipratropium-albuterol (DUONEB) nebulizer solution 1 ampule, 1 ampule, Inhalation, Q4H WA  albuterol (PROVENTIL) nebulizer solution 2.5 mg, 2.5 mg, Nebulization, Q6H PRN  diphenhydrAMINE (BENADRYL) tablet 25 mg, 25 mg, Oral, Q6H PRN  insulin lispro (HUMALOG) injection vial 0-6 Units, 0-6 Units, Subcutaneous, TID WC  insulin lispro (HUMALOG) injection vial 0-3 Units, 0-3 Units, Subcutaneous, Nightly  allopurinol (ZYLOPRIM) tablet 100 mg, 100 mg, Oral, Daily  amLODIPine (NORVASC) tablet 10 mg, 10 mg, Oral, Nightly  calcitRIOL (ROCALTROL) capsule 0.25 mcg, 0.25 mcg, Oral, Once per day on Mon Wed Fri  sevelamer (RENVELA) tablet 4,000 mg, 4,000 mg, Oral, TID WC  Roflumilast (DALIRESP) tablet 500 mcg, 500 mcg, Oral, Daily  tamsulosin (FLOMAX) capsule 0.4 mg, 0.4 mg, Oral, Daily  budesonide-formoterol (SYMBICORT) 160-4.5 MCG/ACT inhaler 2 puff, 2 puff, Inhalation, Q12H  montelukast (SINGULAIR) tablet 10 mg, 10 mg, Oral, Nightly  acetaminophen (TYLENOL) tablet 650 mg, 650 mg, Oral, Q4H PRN  aluminum & magnesium hydroxide-simethicone (MAALOX) 200-200-20 MG/5ML suspension 30 mL, 30 mL, Oral, Q6H PRN  diphenhydrAMINE (BENADRYL) injection 50 mg, 50 mg, Intramuscular, Q4H PRN  haloperidol lactate (HALDOL) injection 5 mg, 5 mg, Intramuscular, Q4H PRN  haloperidol (HALDOL) tablet 5 mg, 5 mg, Oral, Q4H PRN  hydrOXYzine (ATARAX) tablet 50 mg, 50 mg, Oral, TID PRN  ibuprofen (ADVIL;MOTRIN) tablet 400 mg, 400 mg, Oral, Q6H PRN  LORazepam (ATIVAN) injection 2 mg, 2 mg, Intramuscular, Q4H PRN  polyethylene glycol (GLYCOLAX) packet 17 g, 17 g, Oral, Daily PRN  traZODone (DESYREL) tablet 50 mg, 50 mg, Oral, Nightly PRN  benzocaine-menthol (CEPACOL SORE THROAT) lozenge 1 lozenge, 1 lozenge, Oral, Q2H PRN  LORazepam (ATIVAN) tablet 2 mg, 2 mg, Oral, Q4H PRN    ASSESSMENT  Schizoaffective disorder [facility])     PLAN  Patient s symptoms   are improving  Will add Zoloft to help with his mood  Attempt to develop insight  Psycho-education conducted. Supportive Therapy conducted. Probable discharge is TBD  Follow-up TBD    More than 16 mins of the session was spent doing Supportive psychotherapy. Session started at 9:30am and ended at 10am.     Electronically signed by Jose Daniel Byrnes MD on 9/3/20 at 9:35 PM EDT    **This report has been created using voice recognition software. It may contain minor errors which are inherent in voice recognition technology. **

## 2020-09-04 NOTE — BH NOTE
Notified Dr. Paula Velasquez of high BP. Will continue to follow prescribed BP medication order in [facility].  No additional orders at this time

## 2020-09-04 NOTE — PROGRESS NOTES
Writer spoke with Sara,dialysis coordinator at [facility]. Pt follows with Dr. Aspen Melgoza and runs for 3 hours at [facility].  Will notify Dr. Kizzy Gomes.

## 2020-09-04 NOTE — PROGRESS NOTES
Treatment time: 210 minutes    Net UF: 1000 ml    Pre weight: 77.1 kg  Post weight: n/a  EDW: 77 kg    Access used: AVF right forearm  Access function: good    Medications or blood products given: n/a    Regular outpatient schedule: pt runs 4x a week at [facility] of response to treatment: Pt tolerated tx fairly, clots noted both arterial and venous chambers, pt exhibited disruptive behavior throughout dialysis tx. Pt ripped BP cuff at end of tx, ripped tape off his arm and threw it on the ground with the garbage can next to the bed, pt used the f--- word when speaking to writer, and refused to put on gown for dialysis tx, pt also screamed out for writer during tx with sitter sitting at bedside. Dr. Brice Lopez notified of pt's behavior. Copy of dialysis treatment record placed in chart, to be scanned into EMR.

## 2020-09-04 NOTE — GROUP NOTE
Group Therapy Note    Date: 9/4/2020    Group Start Time: 1100  Group End Time: 5840  Group Topic: Recreational    [facility]    NKECHI Brasher        Group Therapy Note    Attendees: 5/9       Patient's Goal:  To improve coping skills         Status After Intervention:  Improved    Participation Level:  Active Listener and Interactive    Participation Quality: Appropriate       Speech:  normal      Thought Process/Content: Logical      Affective Functioning: flat      Mood: depressed      Level of consciousness:  Alert      Response to Learning: Able to verbalize current knowledge/experience and Progressing to goal      Endings: None Reported    Modes of Intervention: Education, Support and Problem-solving      Discipline Responsible: Psychoeducational Specialist      Signature:  Rocky Brown

## 2020-09-04 NOTE — PROGRESS NOTES
Writer spoke with Dr. Patricia Dhillon regarding pt following with Dr. Alejandro Mitchell, will transfer care to Dr. Alejandro Mitchell. Dr. Alejandro Mitchell notified of pt's admission. Orders reviewed and new orders received.

## 2020-09-04 NOTE — PLAN OF CARE
Problem: Altered Mood, Deterioration in Function:  Goal: Ability to perform activities of daily living will improve  Description: Ability to perform activities of daily living will improve  9/4/2020 0953 by Thomas Monique LPN  Outcome: Ongoing  Note: Patient denies any thoughts of harm to self or others and denies hallucinations. Irritable at times. Refuses to attend groups despite staff encouragement. Patient is able to tend to ADL's on own. Will continue to provide encouragement and support as needed. Safe environment maintained. 9/3/2020 2144 by Ruddy Morales LPN  Outcome: Ongoing     Problem: Altered Mood, Deterioration in Function:  Goal: Maintenance of adequate nutrition will improve  Description: Maintenance of adequate nutrition will improve  Outcome: Ongoing  Note: Appetite is good. Patient eats 75% of meals. Problem: Falls - Risk of:  Goal: Will remain free from falls  Description: Will remain free from falls  9/4/2020 0953 by Thomas Monique LPN  Outcome: Ongoing  Note: Patient remains free of falls and verbalizes understanding of individual fall risks. Patient is wearing non skid footwear and has 1:1 for safety. Patient encouraged to seek out staff for any assistance needed.    9/3/2020 2144 by Ruddy Morales LPN  Outcome: Ongoing

## 2020-09-04 NOTE — CARE COORDINATION
SOCIAL SERVICE NOTE: FRANKIE speaks with Admissions Director at [facility] requesting that PT be sent back to them next week, and that they be given some information regarding PT medication and if he is being compliant with medications. FRANKIE coordinated with PT physician. FRANKIE will continue to monitor the situation.

## 2020-09-04 NOTE — GROUP NOTE
Group Therapy Note    Date: 9/4/2020    Group Start Time: 0900  Group End Time: 0930  Group Topic: Community Meeting    NKECHI Turner    Pt did not attend 0900 goal setting group d/t resting in room despite staff invitation to attend. 1:1 talk time offered as alternative to group session, pt declined.            Signature:  Tiesha Huynh

## 2020-09-04 NOTE — PROGRESS NOTES
Writer notified [facility] unit that dialysis is ready for pt. Mikhail Aviles will call back to give report.

## 2020-09-04 NOTE — GROUP NOTE
Group Therapy Note    Date: 9/4/2020    Group Start Time: 1000  Group End Time: 9967  Group Topic: Psychotherapy    DEBORA Parekh        Group Therapy Note    Attendees: 3/9          Pt declined to attend psychotherapy at 1000 am despite encouragement. Pt offered 1:1 and accepted/refused.                Discipline Responsible: /Counselor      Signature:  DEBORA Sims

## 2020-09-04 NOTE — PLAN OF CARE
Problem: Altered Mood, Deterioration in Function:  Goal: Ability to perform activities of daily living will improve  Description: Ability to perform activities of daily living will improve  9/3/2020 2144 by Swathi King LPN  Outcome: Ongoing     Problem: Falls - Risk of:  Goal: Will remain free from falls  Description: Will remain free from falls  9/3/2020 2144 by Swathi King LPN  Outcome: Ongoing   Patient is free of falls at this time. Patient uses a wheelchair. Patient is wearing non skid footwear and agrees to seek out staff for assistance as needed. Patient denies suicidal ideas at this time. Patient denies homicidal ideas at this time. Patient denies depressive symptoms at this time. Patient has been out in day room watching tv and socializing with peers. Patient is free of self harm at this time. Patient agrees to seek out staff if thoughts to harm self arise. Staff will provide support and reassurance as needed. Safety checks maintained every 15 minutes. Patient is on a 1:1 at this time for oxygen use and fall risk.

## 2020-09-04 NOTE — PROGRESS NOTES
Reason for Follow up:  ESRD    HISTORY:    C/o cough. Refusing to run full HD time. Seen on HD. Access working well. BP improving with UF. Review Of Systems:     Constitutional: No fever, chills, lethargy, weakness or wt loss  HEENT:  No headache, nasal discharge or sore throat. Cardiac:  No chest pain, dyspnea, orthopnea or PND. Chest:      + cough,No phlegm or wheezing. Abdomen:  No abdominal pain, nausea, vomiting or diarrhea. Neuro:  No gross focal weakness, numbness, abnormal movements or seizure like activity. Skin:   No rashes or itching. :   No hematuria, pyuria, dysuria or flank pain. Extremities:  No swelling or joint pains. Endocrine: No polyuria, polydypsia, or thyroid problems. Hematology:    No bleeding disorders, bruising or anemia. All other ROS is negative.     Scheduled Meds:   sertraline  25 mg Oral Daily    ipratropium-albuterol  1 ampule Inhalation Q4H WA    insulin lispro  0-6 Units Subcutaneous TID WC    insulin lispro  0-3 Units Subcutaneous Nightly    allopurinol  100 mg Oral Daily    amLODIPine  10 mg Oral Nightly    calcitRIOL  0.25 mcg Oral Once per day on Mon Wed Fri    sevelamer  4,000 mg Oral TID WC    Roflumilast  500 mcg Oral Daily    tamsulosin  0.4 mg Oral Daily    budesonide-formoterol  2 puff Inhalation Q12H    montelukast  10 mg Oral Nightly     Continuous Infusions:   dextrose       PRN Meds:glucose, dextrose, glucagon (rDNA), dextrose, albuterol, diphenhydrAMINE, acetaminophen, aluminum & magnesium hydroxide-simethicone, diphenhydrAMINE, haloperidol lactate, haloperidol, hydrOXYzine, ibuprofen, LORazepam, polyethylene glycol, traZODone, benzocaine-menthol, LORazepam    Allergies   Allergen Reactions    Penicillins        Physical Exam:    Vitals:    09/03/20 2130 09/04/20 0857 09/04/20 1206 09/04/20 1600   BP: (!) 186/72   (!) 156/89   Pulse:    89   Resp:  18 18    Temp:       TempSrc:       SpO2:  95% 96%    Weight:       Height:         No intake/output data recorded. General:  Awake, alert, not in distress. Appears to be stated age. HEENT: Atraumatic, normocephalic. Anicteric sclera. Pink and moist oral mucosa. Neck supple. No JVD. Chest: Bilateral air entry, clear to auscultation, no wheezing, rhonchi or rales. Cardiovascular: RRR, S1S2, no murmur, rub or gallop. No lower extremity edema. Abdomen: Soft, non tender to palpation. Active bowel sounds x 4 quadrants. Musculoskeletal: Active ROM x 4 extremities. No cyanosis or clubbing. Integumentary: Pink, warm and dry. Free from rash or lesions. Skin turgor normal.  CNS: Oriented to person, place and time. Speech clear. Face symmetrical. No tremor.      Data:    CBC:   Lab Results   Component Value Date    WBC 7.2 09/02/2020    HGB 12.2 (L) 09/02/2020    HCT 36.7 (L) 09/02/2020    .1 (H) 09/02/2020     09/02/2020     BMP:    Lab Results   Component Value Date     (L) 09/04/2020     (L) 09/02/2020     05/08/2019    K 5.6 (H) 09/04/2020    K 4.1 09/02/2020    K 5.4 (H) 06/10/2019    CL 90 (L) 09/04/2020    CL 90 (L) 09/02/2020    CL 96 (L) 05/08/2019    CO2 26 09/04/2020    CO2 31 09/02/2020    CO2 22 05/08/2019    BUN 60 (H) 09/04/2020    BUN 34 (H) 09/02/2020    BUN 34 (H) 05/08/2019    CREATININE 7.69 (HH) 09/04/2020    CREATININE 5.17 (HH) 09/02/2020    CREATININE 4.33 (H) 05/08/2019    GLUCOSE 122 (H) 09/04/2020    GLUCOSE 184 (H) 09/02/2020    GLUCOSE 89 05/08/2019     CMP:   Lab Results   Component Value Date     09/04/2020    K 5.6 09/04/2020    CL 90 09/04/2020    CO2 26 09/04/2020    BUN 60 09/04/2020    CREATININE 7.69 09/04/2020    GLUCOSE 122 09/04/2020    GLUCOSE 112 10/07/2011    CALCIUM 9.5 09/04/2020    PROT 6.9 02/12/2019    LABALBU 3.8 02/12/2019    BILITOT 0.29 02/12/2019    ALKPHOS 124 02/12/2019    AST 19 02/12/2019    ALT 14 02/12/2019      Hepatic:   Lab Results   Component Value Date    AST 19 02/12/2019    AST 17 08/16/2018 AST 13 07/26/2017    ALT 14 02/12/2019    ALT 9 08/16/2018    ALT 7 07/26/2017    BILITOT 0.29 (L) 02/12/2019    BILITOT 0.18 (L) 08/16/2018    BILITOT 0.20 (L) 07/26/2017    ALKPHOS 124 02/12/2019    ALKPHOS 188 (H) 08/16/2018    ALKPHOS 154 (H) 07/26/2017     BNP: No results found for: BNP  Lipids:   Lab Results   Component Value Date    CHOL 155 02/12/2019    HDL 57 09/03/2020     INR: No results found for: INR  PTH: No results found for: PTH  Phosphorus:    Lab Results   Component Value Date    PHOS 3.9 09/02/2020     Ionized Calcium: No results found for: IONCA  Magnesium:   Lab Results   Component Value Date    MG 2.5 10/25/2019     Albumin:   Lab Results   Component Value Date    LABALBU 3.8 02/12/2019     Last 3 CK, CKMB, Troponin: @LABRCNT(CKTOTAL:3,CKMB:3,TROPONINI:3)       URINE:)No results found for: Gayatri Blunt    Radiology: Reviewed    Assessment:  1. ESRD  2. Hyperkalemia  3. HTN  4. Hyponatremia  5. Noncompliance       Plan:  1. HD today. Continue HD MWF while admission. Encourage pt to staff full HD times. 2. Monitor BP. Encourage compliance with oral medications. Monitor BP with UF. Continue norvasc. 3. Follow up phos level. Continue renvela. 4. Hgb at goal.  Follow up CBC MWF.   5. Renal diet with 1200mL fluid restriction. 6. Follow up labs ordered. BMP daily. 7. DC motrin and MOM. Please do not hesitate to call with questions. Pt seen in collaboration with Dr. Yumiko Hughes. Electronically signed by LIZZ Wade CNP  on 9/4/2020 at 4:24 PM     Physician Addendum  I have seen and examined pt at bed side.    I reviewed and agree with CNP's note. I performed all key and critical portions of this evaluation.  I agree with the plan of care as noted above.     Electronically signed by Hunter Duran MD on 09/04/20 4:50 PM

## 2020-09-05 LAB
ANION GAP SERPL CALCULATED.3IONS-SCNC: 13 MMOL/L (ref 9–17)
BUN BLDV-MCNC: 22 MG/DL (ref 6–20)
BUN/CREAT BLD: ABNORMAL (ref 9–20)
CALCIUM SERPL-MCNC: 9.2 MG/DL (ref 8.6–10.4)
CHLORIDE BLD-SCNC: 97 MMOL/L (ref 98–107)
CO2: 27 MMOL/L (ref 20–31)
CREAT SERPL-MCNC: 4.33 MG/DL (ref 0.7–1.2)
GFR AFRICAN AMERICAN: 17 ML/MIN
GFR NON-AFRICAN AMERICAN: 14 ML/MIN
GFR SERPL CREATININE-BSD FRML MDRD: ABNORMAL ML/MIN/{1.73_M2}
GFR SERPL CREATININE-BSD FRML MDRD: ABNORMAL ML/MIN/{1.73_M2}
GLUCOSE BLD-MCNC: 130 MG/DL (ref 70–99)
GLUCOSE BLD-MCNC: 76 MG/DL (ref 75–110)
GLUCOSE BLD-MCNC: 80 MG/DL (ref 75–110)
GLUCOSE BLD-MCNC: 85 MG/DL (ref 75–110)
GLUCOSE BLD-MCNC: 90 MG/DL (ref 75–110)
MAGNESIUM: 2.2 MG/DL (ref 1.6–2.6)
PHOSPHORUS: 5.2 MG/DL (ref 2.5–4.5)
POTASSIUM SERPL-SCNC: 4.5 MMOL/L (ref 3.7–5.3)
SODIUM BLD-SCNC: 137 MMOL/L (ref 135–144)

## 2020-09-05 PROCEDURE — 6370000000 HC RX 637 (ALT 250 FOR IP): Performed by: PSYCHIATRY & NEUROLOGY

## 2020-09-05 PROCEDURE — 82947 ASSAY GLUCOSE BLOOD QUANT: CPT

## 2020-09-05 PROCEDURE — 90833 PSYTX W PT W E/M 30 MIN: CPT | Performed by: PSYCHIATRY & NEUROLOGY

## 2020-09-05 PROCEDURE — 6370000000 HC RX 637 (ALT 250 FOR IP): Performed by: NURSE PRACTITIONER

## 2020-09-05 PROCEDURE — 1240000000 HC EMOTIONAL WELLNESS R&B

## 2020-09-05 PROCEDURE — 6370000000 HC RX 637 (ALT 250 FOR IP): Performed by: INTERNAL MEDICINE

## 2020-09-05 PROCEDURE — 80048 BASIC METABOLIC PNL TOTAL CA: CPT

## 2020-09-05 PROCEDURE — 83735 ASSAY OF MAGNESIUM: CPT

## 2020-09-05 PROCEDURE — 99232 SBSQ HOSP IP/OBS MODERATE 35: CPT | Performed by: PSYCHIATRY & NEUROLOGY

## 2020-09-05 PROCEDURE — 94640 AIRWAY INHALATION TREATMENT: CPT

## 2020-09-05 PROCEDURE — 84100 ASSAY OF PHOSPHORUS: CPT

## 2020-09-05 PROCEDURE — 94761 N-INVAS EAR/PLS OXIMETRY MLT: CPT

## 2020-09-05 PROCEDURE — 36415 COLL VENOUS BLD VENIPUNCTURE: CPT

## 2020-09-05 RX ORDER — LISINOPRIL 10 MG/1
10 TABLET ORAL DAILY
Status: DISCONTINUED | OUTPATIENT
Start: 2020-09-05 | End: 2020-09-06

## 2020-09-05 RX ORDER — LIDOCAINE 4 G/G
1 PATCH TOPICAL DAILY
Status: DISCONTINUED | OUTPATIENT
Start: 2020-09-05 | End: 2020-09-08 | Stop reason: HOSPADM

## 2020-09-05 RX ADMIN — ACETAMINOPHEN 650 MG: 325 TABLET, FILM COATED ORAL at 20:56

## 2020-09-05 RX ADMIN — IPRATROPIUM BROMIDE AND ALBUTEROL SULFATE 1 AMPULE: 2.5; .5 SOLUTION RESPIRATORY (INHALATION) at 09:00

## 2020-09-05 RX ADMIN — TRAZODONE HYDROCHLORIDE 50 MG: 50 TABLET ORAL at 20:49

## 2020-09-05 RX ADMIN — ACETAMINOPHEN 650 MG: 325 TABLET, FILM COATED ORAL at 08:49

## 2020-09-05 RX ADMIN — IPRATROPIUM BROMIDE AND ALBUTEROL SULFATE 1 AMPULE: 2.5; .5 SOLUTION RESPIRATORY (INHALATION) at 20:10

## 2020-09-05 RX ADMIN — MONTELUKAST 10 MG: 10 TABLET, FILM COATED ORAL at 20:49

## 2020-09-05 RX ADMIN — ALLOPURINOL 100 MG: 100 TABLET ORAL at 08:32

## 2020-09-05 RX ADMIN — SEVELAMER CARBONATE 4000 MG: 800 TABLET, FILM COATED ORAL at 17:56

## 2020-09-05 RX ADMIN — ACETAMINOPHEN 650 MG: 325 TABLET, FILM COATED ORAL at 13:08

## 2020-09-05 RX ADMIN — SEVELAMER CARBONATE 4000 MG: 800 TABLET, FILM COATED ORAL at 08:32

## 2020-09-05 RX ADMIN — LISINOPRIL 10 MG: 10 TABLET ORAL at 12:02

## 2020-09-05 RX ADMIN — SERTRALINE HYDROCHLORIDE 25 MG: 25 TABLET ORAL at 08:32

## 2020-09-05 RX ADMIN — AMLODIPINE BESYLATE 10 MG: 10 TABLET ORAL at 20:49

## 2020-09-05 RX ADMIN — ROFLUMILAST 500 MCG: 500 TABLET ORAL at 08:32

## 2020-09-05 RX ADMIN — ACETAMINOPHEN 650 MG: 325 TABLET, FILM COATED ORAL at 03:17

## 2020-09-05 RX ADMIN — HYDROXYZINE HYDROCHLORIDE 50 MG: 50 TABLET, FILM COATED ORAL at 20:49

## 2020-09-05 RX ADMIN — TAMSULOSIN HYDROCHLORIDE 0.4 MG: 0.4 CAPSULE ORAL at 08:32

## 2020-09-05 RX ADMIN — DIPHENHYDRAMINE HCL 25 MG: 25 TABLET ORAL at 20:49

## 2020-09-05 RX ADMIN — IPRATROPIUM BROMIDE AND ALBUTEROL SULFATE 1 AMPULE: 2.5; .5 SOLUTION RESPIRATORY (INHALATION) at 13:15

## 2020-09-05 RX ADMIN — SEVELAMER CARBONATE 4000 MG: 800 TABLET, FILM COATED ORAL at 11:52

## 2020-09-05 ASSESSMENT — PAIN DESCRIPTION - PROGRESSION: CLINICAL_PROGRESSION: NOT CHANGED

## 2020-09-05 ASSESSMENT — PAIN DESCRIPTION - DESCRIPTORS: DESCRIPTORS: CONSTANT

## 2020-09-05 ASSESSMENT — PAIN - FUNCTIONAL ASSESSMENT
PAIN_FUNCTIONAL_ASSESSMENT: 0-10
PAIN_FUNCTIONAL_ASSESSMENT: 0-10

## 2020-09-05 ASSESSMENT — PAIN SCALES - GENERAL
PAINLEVEL_OUTOF10: 4
PAINLEVEL_OUTOF10: 2
PAINLEVEL_OUTOF10: 0
PAINLEVEL_OUTOF10: 1
PAINLEVEL_OUTOF10: 3
PAINLEVEL_OUTOF10: 0
PAINLEVEL_OUTOF10: 1
PAINLEVEL_OUTOF10: 3
PAINLEVEL_OUTOF10: 1

## 2020-09-05 ASSESSMENT — PAIN DESCRIPTION - ORIENTATION: ORIENTATION: LEFT

## 2020-09-05 ASSESSMENT — PAIN DESCRIPTION - PAIN TYPE
TYPE: CHRONIC PAIN
TYPE: CHRONIC PAIN

## 2020-09-05 ASSESSMENT — PAIN DESCRIPTION - LOCATION: LOCATION: NECK

## 2020-09-05 NOTE — PLAN OF CARE
Problem: Altered Mood, Deterioration in Function:  Goal: Ability to perform activities of daily living will improve  Description: Ability to perform activities of daily living will improve  Outcome: Ongoing   Patient is out and social with peers, in dayroom, and attending groups. Patient denies depression at this time and is pleasant during assessment. Problem: Falls - Risk of:  Goal: Will remain free from falls  Description: Will remain free from falls  Outcome: Ongoing   Patient remains free from falls at this time. Patient used a wheelchair to ambulate around the unit. 1:1 monitoring continued due to patient being a fall risk.  Patient is fifteen minute safety check

## 2020-09-05 NOTE — PROGRESS NOTES
Daily Progress Note  Verna Sheppard MD  9/4/2020  CHIEF COMPLAINT:  agitation    Reviewed patient's current plan of care and vital signs with nursing staff. Sleep:  7 hours last night  Attending groups: Yes    SUBJECTIVE:    Patient is doing better here. Denies any suicidal or homicidal ideation plan or intent. Tolerating Zoloft well and denies any side effect from the medication. No signs of any aggression or agitation as mentioned by the nursing facility. Discussed with nursing facility about transferring him back to the facility however they were adamant about taking him and were asking for more observation. Agreed to the plan. Continues to ambulate using a wheelchair. He has a sitter due to oxygen use. Mental Status Exam  Level of consciousness:  Within normal limits  Appearance: Hospital attire, seated in wheel chair, with good grooming and hygiene   Behavior/Motor: No abnormalities noted  Attitude toward examiner:  Cooperative, attentive, good eye contact  Speech:  spontaneous, normal rate, normal volume and well articulated  Mood:  euthymic  Affect: congruent with mood  Thought processes:  linear, goal directed and coherent  Thought content:  denies homicidal ideation  Suicidal Ideation: denies suicidal ideation  Delusions:  no evidence of delusions  Perceptual Disturbance:  denies any perceptual disturbance  Cognition:  Oriented to self, location, time, and situation  Memory: age appropriate  Insight & Judgement: improving  Medication side effects:  denies       Data   height is 5' 8\" (1.727 m) and weight is 170 lb (77.1 kg). His oral temperature is 98.1 °F (36.7 °C). His blood pressure is 157/77 (abnormal) and his pulse is 97. His respiration is 18 and oxygen saturation is 96%.    Labs:   Admission on 09/01/2020   Component Date Value Ref Range Status    POC Glucose 09/02/2020 98  75 - 110 mg/dL Final    POC Glucose 09/02/2020 73* 75 - 110 mg/dL Final    Hemoglobin A1C 09/03/2020 5.7 4.0 - 6.0 % Final    Estimated Avg Glucose 09/03/2020 117  mg/dL Final    Comment: The ADA and AACC recommend providing the estimated average glucose result to permit better   patient understanding of their HBA1c result.  Cholesterol, Fasting 09/03/2020 156  <200 mg/dL Final    Comment:    Cholesterol Guidelines:      <200  Desirable   200-240  Borderline      >240  Undesirable         HDL 09/03/2020 57  >40 mg/dL Final    Comment:    HDL Guidelines:    <40     Undesirable   40-59    Borderline    >59     Desirable         LDL Cholesterol 09/03/2020 79  0 - 130 mg/dL Final    Comment:    LDL Guidelines:     <100    Desirable   100-129   Near to/above Desirable   130-159   Borderline      >159   Undesirable     Direct (measured) LDL and calculated LDL are not interchangeable tests.  Chol/HDL Ratio 09/03/2020 2.7  <5 Final            Triglyceride, Fasting 09/03/2020 101  <150 mg/dL Final    Comment:    Triglyceride Guidelines:     <150   Desirable   150-199  Borderline   200-499  High     >499   Very high   Based on AHA Guidelines for fasting triglyceride, October 2012.          VLDL 09/03/2020 NOT REPORTED  1 - 30 mg/dL Final    Uric Acid 09/03/2020 4.6  3.4 - 7.0 mg/dL Final    Glucose 09/02/2020 184* 70 - 99 mg/dL Final    BUN 09/02/2020 34* 6 - 20 mg/dL Final    CREATININE 09/02/2020 5.17* 0.70 - 1.20 mg/dL Final    Bun/Cre Ratio 09/02/2020 NOT REPORTED  9 - 20 Final    Calcium 09/02/2020 8.6  8.6 - 10.4 mg/dL Final    Sodium 09/02/2020 134* 135 - 144 mmol/L Final    Potassium 09/02/2020 4.1  3.7 - 5.3 mmol/L Final    Chloride 09/02/2020 90* 98 - 107 mmol/L Final    CO2 09/02/2020 31  20 - 31 mmol/L Final    Anion Gap 09/02/2020 13  9 - 17 mmol/L Final    GFR Non- 09/02/2020 12* >60 mL/min Final    GFR  09/02/2020 14* >60 mL/min Final    GFR Comment 09/02/2020        Final    Comment: Average GFR for 52-63 years old:   80 mL/min/1.73sq m  Chronic Kidney Disease:   <60 mL/min/1.73sq m  Kidney failure:   <15 mL/min/1.73sq m              eGFR calculated using average adult body mass.  Additional eGFR calculator available at:        [email or web address]            GFR Staging 09/02/2020 NOT REPORTED   Final    WBC 09/02/2020 7.2  3.5 - 11.0 k/uL Final    RBC 09/02/2020 3.63* 4.5 - 5.9 m/uL Final    Hemoglobin 09/02/2020 12.2* 13.5 - 17.5 g/dL Final    Hematocrit 09/02/2020 36.7* 41 - 53 % Final    MCV 09/02/2020 101.1* 80 - 100 fL Final    MCH 09/02/2020 33.7  26 - 34 pg Final    MCHC 09/02/2020 33.3  31 - 37 g/dL Final    RDW 09/02/2020 14.3  11.5 - 14.9 % Final    Platelets 61/73/4446 176  150 - 450 k/uL Final    MPV 09/02/2020 7.1  6.0 - 12.0 fL Final    NRBC Automated 09/02/2020 NOT REPORTED  per 100 WBC Final    Differential Type 09/02/2020 NOT REPORTED   Final    Seg Neutrophils 09/02/2020 81* 36 - 66 % Final    Lymphocytes 09/02/2020 10* 24 - 44 % Final    Monocytes 09/02/2020 6  1 - 7 % Final    Eosinophils % 09/02/2020 2  0 - 4 % Final    Basophils 09/02/2020 1  0 - 2 % Final    Immature Granulocytes 09/02/2020 NOT REPORTED  0 % Final    Segs Absolute 09/02/2020 5.90  1.3 - 9.1 k/uL Final    Absolute Lymph # 09/02/2020 0.70* 1.0 - 4.8 k/uL Final    Absolute Mono # 09/02/2020 0.40  0.1 - 1.3 k/uL Final    Absolute Eos # 09/02/2020 0.20  0.0 - 0.4 k/uL Final    Basophils Absolute 09/02/2020 0.00  0.0 - 0.2 k/uL Final    Absolute Immature Granulocyte 09/02/2020 NOT REPORTED  0.00 - 0.30 k/uL Final    WBC Morphology 09/02/2020 NOT REPORTED   Final    RBC Morphology 09/02/2020 NOT REPORTED   Final    Platelet Estimate 40/57/9693 NOT REPORTED   Final    POC Glucose 09/02/2020 73* 75 - 110 mg/dL Final    Phosphorus 09/02/2020 3.9  2.5 - 4.5 mg/dL Final    POC Glucose 09/03/2020 89  75 - 110 mg/dL Final    Glucose 09/04/2020 122* 70 - 99 mg/dL Final    BUN 09/04/2020 60* 6 - 20 mg/dL Final    CREATININE 09/04/2020 7.69* 0.70 - 1.20 mg/dL Final    Bun/Cre Ratio 09/04/2020 NOT REPORTED  9 - 20 Final    Calcium 09/04/2020 9.5  8.6 - 10.4 mg/dL Final    Sodium 09/04/2020 132* 135 - 144 mmol/L Final    Potassium 09/04/2020 5.6* 3.7 - 5.3 mmol/L Final    Chloride 09/04/2020 90* 98 - 107 mmol/L Final    CO2 09/04/2020 26  20 - 31 mmol/L Final    Anion Gap 09/04/2020 16  9 - 17 mmol/L Final    GFR Non- 09/04/2020 7* >60 mL/min Final    GFR  09/04/2020 9* >60 mL/min Final    GFR Comment 09/04/2020        Final    Comment: Average GFR for 52-63 years old:   80 mL/min/1.73sq m  Chronic Kidney Disease:   <60 mL/min/1.73sq m  Kidney failure:   <15 mL/min/1.73sq m              eGFR calculated using average adult body mass.  Additional eGFR calculator available at:        [employer]            GFR Staging 09/04/2020 NOT REPORTED   Final    POC Glucose 09/03/2020 107  75 - 110 mg/dL Final    POC Glucose 09/03/2020 92  75 - 110 mg/dL Final    POC Glucose 09/04/2020 102  75 - 110 mg/dL Final    POC Glucose 09/04/2020 107  75 - 110 mg/dL Final    POC Glucose 09/04/2020 118* 75 - 110 mg/dL Final            Medications  Current Facility-Administered Medications: sertraline (ZOLOFT) tablet 25 mg, 25 mg, Oral, Daily  glucose (GLUTOSE) 40 % oral gel 15 g, 15 g, Oral, PRN  dextrose 50 % IV solution, 12.5 g, Intravenous, PRN  glucagon (rDNA) injection 1 mg, 1 mg, Intramuscular, PRN  dextrose 5 % solution, 100 mL/hr, Intravenous, PRN  ipratropium-albuterol (DUONEB) nebulizer solution 1 ampule, 1 ampule, Inhalation, Q4H WA  albuterol (PROVENTIL) nebulizer solution 2.5 mg, 2.5 mg, Nebulization, Q6H PRN  diphenhydrAMINE (BENADRYL) tablet 25 mg, 25 mg, Oral, Q6H PRN  insulin lispro (HUMALOG) injection vial 0-6 Units, 0-6 Units, Subcutaneous, TID WC  insulin lispro (HUMALOG) injection vial 0-3 Units, 0-3 Units, Subcutaneous, Nightly  allopurinol (ZYLOPRIM) tablet

## 2020-09-05 NOTE — PROGRESS NOTES
Reason for Follow up:  ESRD    HISTORY:    SBP trending 150-170s. C/o seasonal allergies. Review Of Systems:     Constitutional: No fever, chills, lethargy, weakness or wt loss  HEENT:  No headache, nasal discharge or sore throat. Cardiac:  No chest pain, dyspnea, orthopnea or PND. Chest:      + cough,No phlegm or wheezing. Abdomen:  No abdominal pain, nausea, vomiting or diarrhea. Neuro:  No gross focal weakness, numbness, abnormal movements or seizure like activity. Skin:   No rashes or itching. :   No hematuria, pyuria, dysuria or flank pain. Extremities:  No swelling or joint pains. Endocrine: No polyuria, polydypsia, or thyroid problems. Hematology:    No bleeding disorders, bruising or anemia. All other ROS is negative. Scheduled Meds:   lisinopril  10 mg Oral Daily    sertraline  25 mg Oral Daily    ipratropium-albuterol  1 ampule Inhalation Q4H WA    insulin lispro  0-6 Units Subcutaneous TID WC    insulin lispro  0-3 Units Subcutaneous Nightly    allopurinol  100 mg Oral Daily    amLODIPine  10 mg Oral Nightly    calcitRIOL  0.25 mcg Oral Once per day on Mon Wed Fri    sevelamer  4,000 mg Oral TID WC    Roflumilast  500 mcg Oral Daily    tamsulosin  0.4 mg Oral Daily    budesonide-formoterol  2 puff Inhalation Q12H    montelukast  10 mg Oral Nightly     Continuous Infusions:   dextrose       PRN Meds:glucose, dextrose, glucagon (rDNA), dextrose, albuterol, diphenhydrAMINE, acetaminophen, diphenhydrAMINE, haloperidol lactate, haloperidol, hydrOXYzine, LORazepam, polyethylene glycol, traZODone, benzocaine-menthol, LORazepam    Allergies   Allergen Reactions    Penicillins        Physical Exam:    Vitals:    09/04/20 2027 09/04/20 2036 09/05/20 0800 09/05/20 0900   BP: (!) 157/77  (!) 170/84    Pulse: 97  85    Resp:  18 14 18   Temp:   98.6 °F (37 °C)    TempSrc:   Oral    SpO2:  96%  94%   Weight:       Height:         No intake/output data recorded.       General:  Awake, alert, not in distress. Appears to be stated age. HEENT: Atraumatic, normocephalic. Anicteric sclera. Pink and moist oral mucosa. Neck supple. No JVD. Chest: Bilateral air entry, clear to auscultation, no wheezing, rhonchi or rales. Cardiovascular: RRR, S1S2, no murmur, rub or gallop. No lower extremity edema. Abdomen: Soft, non tender to palpation. Active bowel sounds x 4 quadrants. Musculoskeletal: Active ROM x 4 extremities. No cyanosis or clubbing. Integumentary: Pink, warm and dry. Free from rash or lesions. Skin turgor normal.  CNS: Oriented to person, place and time. Speech clear. Face symmetrical. No tremor.      Data:    CBC:   Lab Results   Component Value Date    WBC 7.2 09/02/2020    HGB 12.2 (L) 09/02/2020    HCT 36.7 (L) 09/02/2020    .1 (H) 09/02/2020     09/02/2020     BMP:    Lab Results   Component Value Date     09/05/2020     (L) 09/04/2020     (L) 09/02/2020    K 4.5 09/05/2020    K 5.6 (H) 09/04/2020    K 4.1 09/02/2020    CL 97 (L) 09/05/2020    CL 90 (L) 09/04/2020    CL 90 (L) 09/02/2020    CO2 27 09/05/2020    CO2 26 09/04/2020    CO2 31 09/02/2020    BUN 22 (H) 09/05/2020    BUN 60 (H) 09/04/2020    BUN 34 (H) 09/02/2020    CREATININE 4.33 (H) 09/05/2020    CREATININE 7.69 (HH) 09/04/2020    CREATININE 5.17 (HH) 09/02/2020    GLUCOSE 130 (H) 09/05/2020    GLUCOSE 122 (H) 09/04/2020    GLUCOSE 184 (H) 09/02/2020     CMP:   Lab Results   Component Value Date     09/05/2020    K 4.5 09/05/2020    CL 97 09/05/2020    CO2 27 09/05/2020    BUN 22 09/05/2020    CREATININE 4.33 09/05/2020    GLUCOSE 130 09/05/2020    GLUCOSE 112 10/07/2011    CALCIUM 9.2 09/05/2020    PROT 6.9 02/12/2019    LABALBU 3.8 02/12/2019    BILITOT 0.29 02/12/2019    ALKPHOS 124 02/12/2019    AST 19 02/12/2019    ALT 14 02/12/2019      Hepatic:   Lab Results   Component Value Date    AST 19 02/12/2019    AST 17 08/16/2018    AST 13 07/26/2017    ALT 14 02/12/2019    ALT 9 08/16/2018    ALT 7 07/26/2017    BILITOT 0.29 (L) 02/12/2019    BILITOT 0.18 (L) 08/16/2018    BILITOT 0.20 (L) 07/26/2017    ALKPHOS 124 02/12/2019    ALKPHOS 188 (H) 08/16/2018    ALKPHOS 154 (H) 07/26/2017     BNP: No results found for: BNP  Lipids:   Lab Results   Component Value Date    CHOL 155 02/12/2019    HDL 57 09/03/2020     INR: No results found for: INR  PTH: No results found for: PTH  Phosphorus:    Lab Results   Component Value Date    PHOS 5.2 09/05/2020     Ionized Calcium: No results found for: IONCA  Magnesium:   Lab Results   Component Value Date    MG 2.2 09/05/2020     Albumin:   Lab Results   Component Value Date    LABALBU 3.8 02/12/2019     Last 3 CK, CKMB, Troponin: @LABRCNT(CKTOTAL:3,CKMB:3,TROPONINI:3)       URINE:)No results found for: Nina Charles    Radiology: Reviewed    Assessment:  1. ESRD  2. Hyperkalemia  3. HTN  4. Hyponatremia  5. Noncompliance       Plan:  1. Continue HD MWF while admission. 2. Monitor BP. Encourage compliance with oral medications. Monitor BP with UF. Continue norvasc. Resume lisinopril 10mg daily. 3. Follow up phos level. Continue renvela. Phos level at goal.  4. Hgb at goal.  Follow up CBC MWF.   5. Renal diet with 1200mL fluid restriction. 6. Follow up labs ordered. BMP daily. Please do not hesitate to call with questions. Pt seen in collaboration with Dr. Nancy Buitrago.     Electronically signed by LIZZ Ayala CNP  on 9/5/2020 at 11:58 AM

## 2020-09-05 NOTE — GROUP NOTE
Group Therapy Note    Date: 9/5/2020    Group Start Time: 1330  Group End Time: 8972  Group Topic: Recreational    STCZ BHKELSEA Costello, JAZS        Group Therapy Note    Attendees: 7/22         Patient's Goal:  To increase social interaction and to practice leisure and creative expression skills and communication skills. Notes: Pt participated partially in group task . Pt was able to practice leisure and creative expression skills and communication skills. Pt is pleasant and social with peers on approach. Status After Intervention:  Improved    Participation Level:  Active Listener and Interactive    Participation Quality: Appropriate, Attentive, Sharing and Supportive      Speech:  normal      Thought Process/Content: Logical r/t task    Affective Functioning: Congruent      Mood: euthymic      Level of consciousness:  Alert, and Attentive      Response to Learning: Able to verbalize current knowledge/experience, Able to verbalize/acknowledge new learning, Able to retain information and Progressing to goal      Endings: None Reported    Modes of Intervention: Education, Support, Socialization, Exploration, Clarifying and Problem-solving      Discipline Responsible: Psychoeducational Specialist      Signature:  Nannette Wilson

## 2020-09-05 NOTE — BH NOTE
DISCHARGE PLANNING UPDATE:  - Writer was not in office on 9/4 but it was reported family is now getting involved in the placement of client and would like him to remain in the [place] area. Client has a guardian and Shannen Rosado will reach out to him again to find out if the discharge placement will change as well as family acknowledging the importance of client being in a facility that offers in-house dialysis. - Writer was previously advised by guardian client was to return to [facility]. - Writer met with client on this date to update him on the placement issue that is being worked on between his guardian, family members and will be able to try other facilities on Tuesday.

## 2020-09-05 NOTE — GROUP NOTE
Group Therapy Note    Date: 9/5/2020    Group Start Time: 1600  Group End Time: 2730  Group Topic: Healthy Living/Wellness    [facility] D    Christian Moreno        Group Therapy Note    Attendees: 8/22         Patient's Goal:  To promote mental wellness and daily affirmation    Notes:      Status After Intervention:  Improved    Participation Level:  Active Listener    Participation Quality: Appropriate      Speech:  normal      Thought Process/Content: Logical      Affective Functioning: Congruent      Mood: normal      Level of consciousness:  Alert      Response to Learning: Able to verbalize current knowledge/experience      Endings: None Reported    Modes of Intervention: Education      Discipline Responsible: [address] [employer]      Signature:  Christian Moreno

## 2020-09-05 NOTE — PROGRESS NOTES
Daily Progress Note  Fady Buchanan MD  9/5/2020  CHIEF COMPLAINT:  agitation    Reviewed patient's current plan of care and vital signs with nursing staff. Sleep:  7 hours last night  Attending groups: Yes    SUBJECTIVE:    Patient reports that he is dealing with severe neck pain. Was asking for pain patches to help with pain. Mentions that he did not sleep well from the pain last night. Does not exhibit any signs of aggression or agitation. Continues to be a disposition issue at this time. Social work did mention that he will be able to go back to nursing facility on Monday. We will continue same medication today and observe. Case discussed with staff and treatment team this morning. Mental Status Exam  Level of consciousness:  Within normal limits  Appearance: Hospital attire, seated in wheel chair, with good grooming and hygiene   Behavior/Motor: No abnormalities noted  Attitude toward examiner:  Cooperative, attentive, good eye contact  Speech:  spontaneous, normal rate, normal volume and well articulated  Mood:  euthymic  Affect: congruent with mood  Thought processes:  linear, goal directed and coherent  Thought content:  denies homicidal ideation  Suicidal Ideation: denies suicidal ideation  Delusions:  no evidence of delusions  Perceptual Disturbance:  denies any perceptual disturbance  Cognition:  Oriented to self, location, time, and situation  Memory: age appropriate  Insight & Judgement: improving  Medication side effects:  denies       Data   height is 5' 8\" (1.727 m) and weight is 170 lb (77.1 kg). His oral temperature is 98.4 °F (36.9 °C). His blood pressure is 181/90 (abnormal) and his pulse is 102. His respiration is 14 and oxygen saturation is 94%.    Labs:   Admission on 09/01/2020   Component Date Value Ref Range Status    POC Glucose 09/02/2020 98  75 - 110 mg/dL Final    POC Glucose 09/02/2020 73* 75 - 110 mg/dL Final    Hemoglobin A1C 09/03/2020 5.7  4.0 - 6.0 % Final    Estimated Avg Glucose 09/03/2020 117  mg/dL Final    Comment: The ADA and AACC recommend providing the estimated average glucose result to permit better   patient understanding of their HBA1c result.  Cholesterol, Fasting 09/03/2020 156  <200 mg/dL Final    Comment:    Cholesterol Guidelines:      <200  Desirable   200-240  Borderline      >240  Undesirable         HDL 09/03/2020 57  >40 mg/dL Final    Comment:    HDL Guidelines:    <40     Undesirable   40-59    Borderline    >59     Desirable         LDL Cholesterol 09/03/2020 79  0 - 130 mg/dL Final    Comment:    LDL Guidelines:     <100    Desirable   100-129   Near to/above Desirable   130-159   Borderline      >159   Undesirable     Direct (measured) LDL and calculated LDL are not interchangeable tests.  Chol/HDL Ratio 09/03/2020 2.7  <5 Final            Triglyceride, Fasting 09/03/2020 101  <150 mg/dL Final    Comment:    Triglyceride Guidelines:     <150   Desirable   150-199  Borderline   200-499  High     >499   Very high   Based on AHA Guidelines for fasting triglyceride, October 2012.          VLDL 09/03/2020 NOT REPORTED  1 - 30 mg/dL Final    Uric Acid 09/03/2020 4.6  3.4 - 7.0 mg/dL Final    Glucose 09/02/2020 184* 70 - 99 mg/dL Final    BUN 09/02/2020 34* 6 - 20 mg/dL Final    CREATININE 09/02/2020 5.17* 0.70 - 1.20 mg/dL Final    Bun/Cre Ratio 09/02/2020 NOT REPORTED  9 - 20 Final    Calcium 09/02/2020 8.6  8.6 - 10.4 mg/dL Final    Sodium 09/02/2020 134* 135 - 144 mmol/L Final    Potassium 09/02/2020 4.1  3.7 - 5.3 mmol/L Final    Chloride 09/02/2020 90* 98 - 107 mmol/L Final    CO2 09/02/2020 31  20 - 31 mmol/L Final    Anion Gap 09/02/2020 13  9 - 17 mmol/L Final    GFR Non- 09/02/2020 12* >60 mL/min Final    GFR  09/02/2020 14* >60 mL/min Final    GFR Comment 09/02/2020        Final    Comment: Average GFR for 52-63 years old:   80 mL/min/1.73sq m  Chronic Kidney Disease:   <60 mL/min/1.73sq m  Kidney failure:   <15 mL/min/1.73sq m              eGFR calculated using average adult body mass.  Additional eGFR calculator available at:        GetYourGuide.br            GFR Staging 09/02/2020 NOT REPORTED   Final    WBC 09/02/2020 7.2  3.5 - 11.0 k/uL Final    RBC 09/02/2020 3.63* 4.5 - 5.9 m/uL Final    Hemoglobin 09/02/2020 12.2* 13.5 - 17.5 g/dL Final    Hematocrit 09/02/2020 36.7* 41 - 53 % Final    MCV 09/02/2020 101.1* 80 - 100 fL Final    MCH 09/02/2020 33.7  26 - 34 pg Final    MCHC 09/02/2020 33.3  31 - 37 g/dL Final    RDW 09/02/2020 14.3  11.5 - 14.9 % Final    Platelets 23/62/9660 176  150 - 450 k/uL Final    MPV 09/02/2020 7.1  6.0 - 12.0 fL Final    NRBC Automated 09/02/2020 NOT REPORTED  per 100 WBC Final    Differential Type 09/02/2020 NOT REPORTED   Final    Seg Neutrophils 09/02/2020 81* 36 - 66 % Final    Lymphocytes 09/02/2020 10* 24 - 44 % Final    Monocytes 09/02/2020 6  1 - 7 % Final    Eosinophils % 09/02/2020 2  0 - 4 % Final    Basophils 09/02/2020 1  0 - 2 % Final    Immature Granulocytes 09/02/2020 NOT REPORTED  0 % Final    Segs Absolute 09/02/2020 5.90  1.3 - 9.1 k/uL Final    Absolute Lymph # 09/02/2020 0.70* 1.0 - 4.8 k/uL Final    Absolute Mono # 09/02/2020 0.40  0.1 - 1.3 k/uL Final    Absolute Eos # 09/02/2020 0.20  0.0 - 0.4 k/uL Final    Basophils Absolute 09/02/2020 0.00  0.0 - 0.2 k/uL Final    Absolute Immature Granulocyte 09/02/2020 NOT REPORTED  0.00 - 0.30 k/uL Final    WBC Morphology 09/02/2020 NOT REPORTED   Final    RBC Morphology 09/02/2020 NOT REPORTED   Final    Platelet Estimate 09/06/2436 NOT REPORTED   Final    POC Glucose 09/02/2020 73* 75 - 110 mg/dL Final    Phosphorus 09/02/2020 3.9  2.5 - 4.5 mg/dL Final    POC Glucose 09/03/2020 89  75 - 110 mg/dL Final    Glucose 09/04/2020 122* 70 - 99 mg/dL Final    BUN 09/04/2020 60* 6 - 20 mg/dL Final    CREATININE 09/04/2020 7.69* 0.70 - 1.20 mg/dL Final    Bun/Cre Ratio 09/04/2020 NOT REPORTED  9 - 20 Final    Calcium 09/04/2020 9.5  8.6 - 10.4 mg/dL Final    Sodium 09/04/2020 132* 135 - 144 mmol/L Final    Potassium 09/04/2020 5.6* 3.7 - 5.3 mmol/L Final    Chloride 09/04/2020 90* 98 - 107 mmol/L Final    CO2 09/04/2020 26  20 - 31 mmol/L Final    Anion Gap 09/04/2020 16  9 - 17 mmol/L Final    GFR Non- 09/04/2020 7* >60 mL/min Final    GFR  09/04/2020 9* >60 mL/min Final    GFR Comment 09/04/2020        Final    Comment: Average GFR for 52-63 years old:   80 mL/min/1.73sq m  Chronic Kidney Disease:   <60 mL/min/1.73sq m  Kidney failure:   <15 mL/min/1.73sq m              eGFR calculated using average adult body mass.  Additional eGFR calculator available at:        [employer].br            GFR Staging 09/04/2020 NOT REPORTED   Final    POC Glucose 09/03/2020 107  75 - 110 mg/dL Final    POC Glucose 09/03/2020 92  75 - 110 mg/dL Final    POC Glucose 09/04/2020 102  75 - 110 mg/dL Final    POC Glucose 09/04/2020 107  75 - 110 mg/dL Final    Glucose 09/05/2020 130* 70 - 99 mg/dL Final    BUN 09/05/2020 22* 6 - 20 mg/dL Final    CREATININE 09/05/2020 4.33* 0.70 - 1.20 mg/dL Final    Bun/Cre Ratio 09/05/2020 NOT REPORTED  9 - 20 Final    Calcium 09/05/2020 9.2  8.6 - 10.4 mg/dL Final    Sodium 09/05/2020 137  135 - 144 mmol/L Final    Potassium 09/05/2020 4.5  3.7 - 5.3 mmol/L Final    Chloride 09/05/2020 97* 98 - 107 mmol/L Final    CO2 09/05/2020 27  20 - 31 mmol/L Final    Anion Gap 09/05/2020 13  9 - 17 mmol/L Final    GFR Non- 09/05/2020 14* >60 mL/min Final    GFR  09/05/2020 17* >60 mL/min Final    GFR Comment 09/05/2020        Final    Comment: Average GFR for 52-63 years old:   80 mL/min/1.73sq m  Chronic Kidney Disease:   <60 mL/min/1.73sq m  Kidney failure:   <15 mL/min/1.73sq m              eGFR calculated using average adult body mass.  Additional eGFR calculator available at:        NI.br            GFR Staging 09/05/2020 NOT REPORTED   Final    Magnesium 09/05/2020 2.2  1.6 - 2.6 mg/dL Final    Phosphorus 09/05/2020 5.2* 2.5 - 4.5 mg/dL Final    POC Glucose 09/04/2020 118* 75 - 110 mg/dL Final    POC Glucose 09/04/2020 203* 75 - 110 mg/dL Final    POC Glucose 09/05/2020 85  75 - 110 mg/dL Final    POC Glucose 09/05/2020 90  75 - 110 mg/dL Final    POC Glucose 09/05/2020 76  75 - 110 mg/dL Final            Medications  Current Facility-Administered Medications: lisinopril (PRINIVIL;ZESTRIL) tablet 10 mg, 10 mg, Oral, Daily  sertraline (ZOLOFT) tablet 25 mg, 25 mg, Oral, Daily  glucose (GLUTOSE) 40 % oral gel 15 g, 15 g, Oral, PRN  dextrose 50 % IV solution, 12.5 g, Intravenous, PRN  glucagon (rDNA) injection 1 mg, 1 mg, Intramuscular, PRN  dextrose 5 % solution, 100 mL/hr, Intravenous, PRN  ipratropium-albuterol (DUONEB) nebulizer solution 1 ampule, 1 ampule, Inhalation, Q4H WA  albuterol (PROVENTIL) nebulizer solution 2.5 mg, 2.5 mg, Nebulization, Q6H PRN  diphenhydrAMINE (BENADRYL) tablet 25 mg, 25 mg, Oral, Q6H PRN  insulin lispro (HUMALOG) injection vial 0-6 Units, 0-6 Units, Subcutaneous, TID   insulin lispro (HUMALOG) injection vial 0-3 Units, 0-3 Units, Subcutaneous, Nightly  allopurinol (ZYLOPRIM) tablet 100 mg, 100 mg, Oral, Daily  amLODIPine (NORVASC) tablet 10 mg, 10 mg, Oral, Nightly  calcitRIOL (ROCALTROL) capsule 0.25 mcg, 0.25 mcg, Oral, Once per day on Mon Wed Fri  sevelamer (RENVELA) tablet 4,000 mg, 4,000 mg, Oral, TID WC  Roflumilast (DALIRESP) tablet 500 mcg, 500 mcg, Oral, Daily  tamsulosin (FLOMAX) capsule 0.4 mg, 0.4 mg, Oral, Daily  budesonide-formoterol (SYMBICORT) 160-4.5 MCG/ACT inhaler 2 puff, 2 puff, Inhalation, Q12H  montelukast (SINGULAIR) tablet 10 mg, 10 mg, Oral, Nightly  acetaminophen (TYLENOL) tablet 650 mg, 650 mg, Oral, Q4H PRN  diphenhydrAMINE (BENADRYL) injection 50 mg, 50 mg, Intramuscular, Q4H PRN  haloperidol lactate (HALDOL) injection 5 mg, 5 mg, Intramuscular, Q4H PRN  haloperidol (HALDOL) tablet 5 mg, 5 mg, Oral, Q4H PRN  hydrOXYzine (ATARAX) tablet 50 mg, 50 mg, Oral, TID PRN  LORazepam (ATIVAN) injection 2 mg, 2 mg, Intramuscular, Q4H PRN  polyethylene glycol (GLYCOLAX) packet 17 g, 17 g, Oral, Daily PRN  traZODone (DESYREL) tablet 50 mg, 50 mg, Oral, Nightly PRN  benzocaine-menthol (CEPACOL SORE THROAT) lozenge 1 lozenge, 1 lozenge, Oral, Q2H PRN  LORazepam (ATIVAN) tablet 2 mg, 2 mg, Oral, Q4H PRN    ASSESSMENT  Schizoaffective disorder ([facility] [address]     PLAN  Patient s symptoms   are improving  Will continue current medications  Will add Lidocaine patch to help with neck pain  Attempt to develop insight  Psycho-education conducted. Supportive Therapy conducted. Probable discharge is monday  Follow-up TBD    More than 16 mins of the session was spent doing Supportive psychotherapy. Session started at 11:30am and ended at 12pm.     Electronically signed by Maureen Person MD on 9/5/20 at 7:13 PM EDT    **This report has been created using voice recognition software. It may contain minor errors which are inherent in voice recognition technology. **

## 2020-09-06 LAB
GLUCOSE BLD-MCNC: 167 MG/DL (ref 75–110)
GLUCOSE BLD-MCNC: 64 MG/DL (ref 75–110)
GLUCOSE BLD-MCNC: 87 MG/DL (ref 75–110)
GLUCOSE BLD-MCNC: 89 MG/DL (ref 75–110)
GLUCOSE BLD-MCNC: 95 MG/DL (ref 75–110)
MAGNESIUM: 2.6 MG/DL (ref 1.6–2.6)
PHOSPHORUS: 5.8 MG/DL (ref 2.5–4.5)

## 2020-09-06 PROCEDURE — 6370000000 HC RX 637 (ALT 250 FOR IP): Performed by: NURSE PRACTITIONER

## 2020-09-06 PROCEDURE — 6370000000 HC RX 637 (ALT 250 FOR IP): Performed by: PSYCHIATRY & NEUROLOGY

## 2020-09-06 PROCEDURE — 82947 ASSAY GLUCOSE BLOOD QUANT: CPT

## 2020-09-06 PROCEDURE — 6360000002 HC RX W HCPCS: Performed by: NURSE PRACTITIONER

## 2020-09-06 PROCEDURE — 1240000000 HC EMOTIONAL WELLNESS R&B

## 2020-09-06 PROCEDURE — 36415 COLL VENOUS BLD VENIPUNCTURE: CPT

## 2020-09-06 PROCEDURE — 94664 DEMO&/EVAL PT USE INHALER: CPT

## 2020-09-06 PROCEDURE — 83735 ASSAY OF MAGNESIUM: CPT

## 2020-09-06 PROCEDURE — 84100 ASSAY OF PHOSPHORUS: CPT

## 2020-09-06 PROCEDURE — 6370000000 HC RX 637 (ALT 250 FOR IP): Performed by: INTERNAL MEDICINE

## 2020-09-06 PROCEDURE — 94640 AIRWAY INHALATION TREATMENT: CPT

## 2020-09-06 PROCEDURE — 90833 PSYTX W PT W E/M 30 MIN: CPT | Performed by: PSYCHIATRY & NEUROLOGY

## 2020-09-06 PROCEDURE — 94761 N-INVAS EAR/PLS OXIMETRY MLT: CPT

## 2020-09-06 PROCEDURE — 99232 SBSQ HOSP IP/OBS MODERATE 35: CPT | Performed by: PSYCHIATRY & NEUROLOGY

## 2020-09-06 RX ORDER — LISINOPRIL 20 MG/1
20 TABLET ORAL DAILY
Status: DISCONTINUED | OUTPATIENT
Start: 2020-09-07 | End: 2020-09-08 | Stop reason: HOSPADM

## 2020-09-06 RX ADMIN — MONTELUKAST 10 MG: 10 TABLET, FILM COATED ORAL at 21:36

## 2020-09-06 RX ADMIN — ROFLUMILAST 500 MCG: 500 TABLET ORAL at 08:44

## 2020-09-06 RX ADMIN — SEVELAMER CARBONATE 4000 MG: 800 TABLET, FILM COATED ORAL at 08:46

## 2020-09-06 RX ADMIN — Medication 1 LOZENGE: at 23:54

## 2020-09-06 RX ADMIN — IPRATROPIUM BROMIDE AND ALBUTEROL SULFATE 1 AMPULE: 2.5; .5 SOLUTION RESPIRATORY (INHALATION) at 08:57

## 2020-09-06 RX ADMIN — ALBUTEROL SULFATE 2.5 MG: 2.5 SOLUTION RESPIRATORY (INHALATION) at 19:28

## 2020-09-06 RX ADMIN — ACETAMINOPHEN 650 MG: 325 TABLET, FILM COATED ORAL at 02:36

## 2020-09-06 RX ADMIN — Medication 1 LOZENGE: at 15:10

## 2020-09-06 RX ADMIN — IPRATROPIUM BROMIDE AND ALBUTEROL SULFATE 1 AMPULE: 2.5; .5 SOLUTION RESPIRATORY (INHALATION) at 20:24

## 2020-09-06 RX ADMIN — SEVELAMER CARBONATE 4000 MG: 800 TABLET, FILM COATED ORAL at 13:41

## 2020-09-06 RX ADMIN — ALBUTEROL SULFATE 2.5 MG: 2.5 SOLUTION RESPIRATORY (INHALATION) at 11:26

## 2020-09-06 RX ADMIN — HYDROXYZINE HYDROCHLORIDE 50 MG: 50 TABLET, FILM COATED ORAL at 02:15

## 2020-09-06 RX ADMIN — Medication 1 LOZENGE: at 19:19

## 2020-09-06 RX ADMIN — SEVELAMER CARBONATE 4000 MG: 800 TABLET, FILM COATED ORAL at 17:58

## 2020-09-06 RX ADMIN — DIPHENHYDRAMINE HCL 25 MG: 25 TABLET ORAL at 21:36

## 2020-09-06 RX ADMIN — LISINOPRIL 10 MG: 10 TABLET ORAL at 08:44

## 2020-09-06 RX ADMIN — ACETAMINOPHEN 650 MG: 325 TABLET, FILM COATED ORAL at 18:21

## 2020-09-06 RX ADMIN — ALBUTEROL SULFATE 2.5 MG: 2.5 SOLUTION RESPIRATORY (INHALATION) at 02:14

## 2020-09-06 RX ADMIN — ALLOPURINOL 100 MG: 100 TABLET ORAL at 08:45

## 2020-09-06 RX ADMIN — ACETAMINOPHEN 650 MG: 325 TABLET, FILM COATED ORAL at 23:54

## 2020-09-06 RX ADMIN — DIPHENHYDRAMINE HCL 25 MG: 25 TABLET ORAL at 02:14

## 2020-09-06 RX ADMIN — AMLODIPINE BESYLATE 10 MG: 10 TABLET ORAL at 21:36

## 2020-09-06 RX ADMIN — TAMSULOSIN HYDROCHLORIDE 0.4 MG: 0.4 CAPSULE ORAL at 08:45

## 2020-09-06 RX ADMIN — SERTRALINE HYDROCHLORIDE 25 MG: 25 TABLET ORAL at 08:44

## 2020-09-06 ASSESSMENT — PAIN - FUNCTIONAL ASSESSMENT
PAIN_FUNCTIONAL_ASSESSMENT: 0-10
PAIN_FUNCTIONAL_ASSESSMENT: 0-10

## 2020-09-06 ASSESSMENT — PAIN SCALES - GENERAL
PAINLEVEL_OUTOF10: 2
PAINLEVEL_OUTOF10: 5
PAINLEVEL_OUTOF10: 0
PAINLEVEL_OUTOF10: 4

## 2020-09-06 NOTE — PLAN OF CARE
Problem: Altered Mood, Deterioration in Function:  Goal: Ability to perform activities of daily living will improve  Description: Ability to perform activities of daily living will improve  9/5/2020 2229 by Latasha Helm LPN  Outcome: Ongoing     Problem: Altered Mood, Deterioration in Function:  Goal: Maintenance of adequate nutrition will improve  Description: Maintenance of adequate nutrition will improve  Outcome: Ongoing     Problem: Falls - Risk of:  Goal: Will remain free from falls  Description: Will remain free from falls  9/5/2020 2229 by Latasha Helm LPN  Outcome: Ongoing  Note: Patient continued on 1 to 1 due to fall risk. Problem: Gas Exchange - Impaired:  Goal: Levels of oxygenation will improve  Description: Levels of oxygenation will improve  Outcome: Ongoing  Note: Patient still refuses to wear O2. Problem: Tobacco Use:  Goal: Inpatient tobacco use cessation counseling participation  Description: Inpatient tobacco use cessation counseling participation  Outcome: Ongoing  Note: Patient quit smoking.

## 2020-09-06 NOTE — PROGRESS NOTES
Reason for Follow up:  ESRD    HISTORY:    SBP elevated today  No new events noted     Review Of Systems:     Constitutional: No fever, chills, lethargy, weakness or wt loss  HEENT:  No headache, nasal discharge or sore throat. Cardiac:  No chest pain, dyspnea, orthopnea or PND. Chest:      + cough,No phlegm or wheezing. Abdomen:  No abdominal pain, nausea, vomiting or diarrhea. Neuro:  No gross focal weakness, numbness, abnormal movements or seizure like activity. Skin:   No rashes or itching. :   No hematuria, pyuria, dysuria or flank pain. Extremities:  No swelling or joint pains. Endocrine: No polyuria, polydypsia, or thyroid problems. Hematology:    No bleeding disorders, bruising or anemia. All other ROS is negative.     Scheduled Meds:   [START ON 9/7/2020] lisinopril  20 mg Oral Daily    lidocaine  1 patch Transdermal Daily    sertraline  25 mg Oral Daily    ipratropium-albuterol  1 ampule Inhalation Q4H WA    insulin lispro  0-6 Units Subcutaneous TID     insulin lispro  0-3 Units Subcutaneous Nightly    allopurinol  100 mg Oral Daily    amLODIPine  10 mg Oral Nightly    calcitRIOL  0.25 mcg Oral Once per day on Mon Wed Fri    sevelamer  4,000 mg Oral TID WC    Roflumilast  500 mcg Oral Daily    tamsulosin  0.4 mg Oral Daily    budesonide-formoterol  2 puff Inhalation Q12H    montelukast  10 mg Oral Nightly     Continuous Infusions:   dextrose       PRN Meds:glucose, dextrose, glucagon (rDNA), dextrose, albuterol, diphenhydrAMINE, acetaminophen, diphenhydrAMINE, haloperidol lactate, haloperidol, hydrOXYzine, LORazepam, polyethylene glycol, traZODone, benzocaine-menthol, LORazepam    Allergies   Allergen Reactions    Penicillins        Physical Exam:    Vitals:    09/05/20 2010 09/06/20 0830 09/06/20 0857 09/06/20 1126   BP:  (!) 152/77     Pulse:  91     Resp:  16 16    Temp:  98 °F (36.7 °C)     TempSrc:  Oral     SpO2: 95%  95% 95%   Weight:       Height:         No intake/output data recorded. General:  Awake, alert, not in distress. Appears to be stated age. HEENT: Atraumatic, normocephalic. Anicteric sclera. Pink and moist oral mucosa. Neck supple. No JVD. Chest: Bilateral air entry, clear to auscultation, no wheezing, rhonchi or rales. Cardiovascular: RRR, S1S2, no murmur, rub or gallop. No lower extremity edema. Abdomen: Soft, non tender to palpation. Active bowel sounds x 4 quadrants. Musculoskeletal: Active ROM x 4 extremities. No cyanosis or clubbing. Integumentary: Pink, warm and dry. Free from rash or lesions. Skin turgor normal.  CNS: Oriented to person, place and time. Speech clear. Face symmetrical. No tremor.      Data:    CBC:   Lab Results   Component Value Date    WBC 7.2 09/02/2020    HGB 12.2 (L) 09/02/2020    HCT 36.7 (L) 09/02/2020    .1 (H) 09/02/2020     09/02/2020     BMP:    Lab Results   Component Value Date     09/05/2020     (L) 09/04/2020     (L) 09/02/2020    K 4.5 09/05/2020    K 5.6 (H) 09/04/2020    K 4.1 09/02/2020    CL 97 (L) 09/05/2020    CL 90 (L) 09/04/2020    CL 90 (L) 09/02/2020    CO2 27 09/05/2020    CO2 26 09/04/2020    CO2 31 09/02/2020    BUN 22 (H) 09/05/2020    BUN 60 (H) 09/04/2020    BUN 34 (H) 09/02/2020    CREATININE 4.33 (H) 09/05/2020    CREATININE 7.69 (HH) 09/04/2020    CREATININE 5.17 (HH) 09/02/2020    GLUCOSE 130 (H) 09/05/2020    GLUCOSE 122 (H) 09/04/2020    GLUCOSE 184 (H) 09/02/2020     CMP:   Lab Results   Component Value Date     09/05/2020    K 4.5 09/05/2020    CL 97 09/05/2020    CO2 27 09/05/2020    BUN 22 09/05/2020    CREATININE 4.33 09/05/2020    GLUCOSE 130 09/05/2020    GLUCOSE 112 10/07/2011    CALCIUM 9.2 09/05/2020    PROT 6.9 02/12/2019    LABALBU 3.8 02/12/2019    BILITOT 0.29 02/12/2019    ALKPHOS 124 02/12/2019    AST 19 02/12/2019    ALT 14 02/12/2019      Hepatic:   Lab Results   Component Value Date    AST 19 02/12/2019    AST 17 08/16/2018

## 2020-09-06 NOTE — GROUP NOTE
Group Therapy Note    Date: 9/6/2020    Group Start Time: 1600  Group End Time: 2236  Group Topic: Healthy Living/Wellness    [facility]    Kenny Rush        Group Therapy Note    Attendees: 7/20         Patient's Goal:   Practice healthy relaxation techniques for stress relief    Notes:      Status After Intervention:  Improved    Participation Level:  Active Listener    Participation Quality: Appropriate      Speech:  normal      Thought Process/Content: Logical      Affective Functioning: Congruent      Mood: normal      Level of consciousness:  Alert      Response to Learning: Able to verbalize current knowledge/experience      Endings: None Reported    Modes of Intervention: Education      Discipline Responsible: [address] [facility] [employer]      Signature:  Kenny Rush

## 2020-09-06 NOTE — PLAN OF CARE
Problem: Altered Mood, Deterioration in Function:  Goal: Ability to perform activities of daily living will improve  Description: Ability to perform activities of daily living will improve  9/5/2020 2256 by Josiane Mendoza RN  Outcome: Ongoing  Note: Patient is oriented x4 ,medication complaint and social in day room with select peers. Patient spent more of shift in day room and talking on the phone to supports. Patient reports mild anxiety denies depression and suicidal ideation and thoughts of self harm. He reports difficulty falling asleep and staying asleep and utilized PRN sleep medication. Staff continues to encourage patient to be an active part of his own recovery and maintains Q 15 minute safety checks. Problem: Altered Mood, Deterioration in Function:  Goal: Maintenance of adequate nutrition will improve  Description: Maintenance of adequate nutrition will improve  9/5/2020 2256 by Josiane Mendoza RN  Outcome: Ongoing     Problem: Falls - Risk of:  Goal: Will remain free from falls  Description: Will remain free from falls  9/5/2020 2256 by Josiane Mendoza RN  Outcome: Ongoing  Note: Patient remains free from falls during this shift by properly using assistive devices and 1:1 staff for help when needed. Problem: Serum Glucose Level - Abnormal:  Goal: Ability to maintain appropriate glucose levels will improve  Description: Ability to maintain appropriate glucose levels will improve  Outcome: Ongoing  Note: Patient maintains glucose level of 80 during this shift. Problem: Gas Exchange - Impaired:  Goal: Levels of oxygenation will improve  Description: Levels of oxygenation will improve  9/5/2020 2256 by Josiane Mendoza RN  Outcome: Ongoing  Note: Patient selectively wears O2 and is compliant with nebulizer treatment.       Problem: Tobacco Use:  Goal: Inpatient tobacco use cessation counseling participation  Description: Inpatient tobacco use cessation counseling participation  9/5/2020 2256 by Tommie Coley RN  Outcome: Ongoing     Problem: Pain:  Goal: Pain level will decrease  Description: Pain level will decrease  Outcome: Ongoing     Problem: Pain:  Goal: Control of acute pain  Description: Control of acute pain  Outcome: Ongoing     Problem: Pain:  Goal: Control of chronic pain  Description: Control of chronic pain  Outcome: Ongoing  Note: Patient complains of neck pain on left side, a lidocaine patch was given for pain relief and patient is satisfied with intervention.      Problem: Musculor/Skeletal Functional Status  Goal: Highest potential functional level  Outcome: Ongoing

## 2020-09-07 LAB
ANION GAP SERPL CALCULATED.3IONS-SCNC: 17 MMOL/L (ref 9–17)
CHLORIDE BLD-SCNC: 95 MMOL/L (ref 98–107)
CO2: 26 MMOL/L (ref 20–31)
GLUCOSE BLD-MCNC: 192 MG/DL (ref 75–110)
GLUCOSE BLD-MCNC: 86 MG/DL (ref 75–110)
GLUCOSE BLD-MCNC: 97 MG/DL (ref 75–110)
MAGNESIUM: 2.4 MG/DL (ref 1.6–2.6)
PHOSPHORUS: 5.8 MG/DL (ref 2.5–4.5)
POTASSIUM SERPL-SCNC: 5.4 MMOL/L (ref 3.7–5.3)
SODIUM BLD-SCNC: 138 MMOL/L (ref 135–144)

## 2020-09-07 PROCEDURE — 1240000000 HC EMOTIONAL WELLNESS R&B

## 2020-09-07 PROCEDURE — 82947 ASSAY GLUCOSE BLOOD QUANT: CPT

## 2020-09-07 PROCEDURE — 6370000000 HC RX 637 (ALT 250 FOR IP): Performed by: NURSE PRACTITIONER

## 2020-09-07 PROCEDURE — 83735 ASSAY OF MAGNESIUM: CPT

## 2020-09-07 PROCEDURE — 84100 ASSAY OF PHOSPHORUS: CPT

## 2020-09-07 PROCEDURE — 36415 COLL VENOUS BLD VENIPUNCTURE: CPT

## 2020-09-07 PROCEDURE — 99231 SBSQ HOSP IP/OBS SF/LOW 25: CPT | Performed by: PSYCHIATRY & NEUROLOGY

## 2020-09-07 PROCEDURE — 80051 ELECTROLYTE PANEL: CPT

## 2020-09-07 PROCEDURE — 6370000000 HC RX 637 (ALT 250 FOR IP): Performed by: INTERNAL MEDICINE

## 2020-09-07 PROCEDURE — 97110 THERAPEUTIC EXERCISES: CPT

## 2020-09-07 PROCEDURE — 6370000000 HC RX 637 (ALT 250 FOR IP): Performed by: PSYCHIATRY & NEUROLOGY

## 2020-09-07 PROCEDURE — 94761 N-INVAS EAR/PLS OXIMETRY MLT: CPT

## 2020-09-07 PROCEDURE — 90935 HEMODIALYSIS ONE EVALUATION: CPT

## 2020-09-07 PROCEDURE — 94640 AIRWAY INHALATION TREATMENT: CPT

## 2020-09-07 RX ADMIN — IPRATROPIUM BROMIDE AND ALBUTEROL SULFATE 1 AMPULE: 2.5; .5 SOLUTION RESPIRATORY (INHALATION) at 11:58

## 2020-09-07 RX ADMIN — Medication 1 LOZENGE: at 12:28

## 2020-09-07 RX ADMIN — IPRATROPIUM BROMIDE AND ALBUTEROL SULFATE 1 AMPULE: 2.5; .5 SOLUTION RESPIRATORY (INHALATION) at 00:29

## 2020-09-07 RX ADMIN — AMLODIPINE BESYLATE 10 MG: 10 TABLET ORAL at 21:00

## 2020-09-07 RX ADMIN — MONTELUKAST 10 MG: 10 TABLET, FILM COATED ORAL at 21:00

## 2020-09-07 RX ADMIN — ACETAMINOPHEN 650 MG: 325 TABLET, FILM COATED ORAL at 09:29

## 2020-09-07 RX ADMIN — SEVELAMER CARBONATE 4000 MG: 800 TABLET, FILM COATED ORAL at 09:28

## 2020-09-07 RX ADMIN — HALOPERIDOL 5 MG: 10 TABLET ORAL at 10:53

## 2020-09-07 RX ADMIN — LORAZEPAM 2 MG: 1 TABLET ORAL at 13:13

## 2020-09-07 RX ADMIN — IPRATROPIUM BROMIDE AND ALBUTEROL SULFATE 1 AMPULE: 2.5; .5 SOLUTION RESPIRATORY (INHALATION) at 08:27

## 2020-09-07 RX ADMIN — INSULIN LISPRO 1 UNITS: 100 INJECTION, SOLUTION INTRAVENOUS; SUBCUTANEOUS at 17:22

## 2020-09-07 RX ADMIN — ROFLUMILAST 500 MCG: 500 TABLET ORAL at 09:28

## 2020-09-07 RX ADMIN — SEVELAMER CARBONATE 4000 MG: 800 TABLET, FILM COATED ORAL at 17:13

## 2020-09-07 RX ADMIN — Medication 1 LOZENGE: at 21:02

## 2020-09-07 RX ADMIN — ACETAMINOPHEN 650 MG: 325 TABLET, FILM COATED ORAL at 17:13

## 2020-09-07 RX ADMIN — DIPHENHYDRAMINE HCL 25 MG: 25 TABLET ORAL at 21:00

## 2020-09-07 RX ADMIN — ALLOPURINOL 100 MG: 100 TABLET ORAL at 09:29

## 2020-09-07 RX ADMIN — SERTRALINE HYDROCHLORIDE 25 MG: 25 TABLET ORAL at 09:28

## 2020-09-07 RX ADMIN — TAMSULOSIN HYDROCHLORIDE 0.4 MG: 0.4 CAPSULE ORAL at 09:28

## 2020-09-07 RX ADMIN — CALCITRIOL 0.25 MCG: 0.25 CAPSULE ORAL at 09:28

## 2020-09-07 RX ADMIN — LISINOPRIL 20 MG: 20 TABLET ORAL at 09:28

## 2020-09-07 ASSESSMENT — PAIN SCALES - GENERAL
PAINLEVEL_OUTOF10: 0
PAINLEVEL_OUTOF10: 6
PAINLEVEL_OUTOF10: 0
PAINLEVEL_OUTOF10: 4
PAINLEVEL_OUTOF10: 2

## 2020-09-07 ASSESSMENT — PAIN SCALES - WONG BAKER: WONGBAKER_NUMERICALRESPONSE: 2

## 2020-09-07 ASSESSMENT — PAIN DESCRIPTION - PROGRESSION: CLINICAL_PROGRESSION: NOT CHANGED

## 2020-09-07 ASSESSMENT — PAIN DESCRIPTION - LOCATION: LOCATION: NECK

## 2020-09-07 ASSESSMENT — PAIN DESCRIPTION - ORIENTATION: ORIENTATION: LEFT

## 2020-09-07 ASSESSMENT — PAIN DESCRIPTION - PAIN TYPE: TYPE: CHRONIC PAIN

## 2020-09-07 NOTE — GROUP NOTE
Group Therapy Note    Date: 9/7/2020    Group Start Time: 1330  Group End Time: 9432  Group Topic: Cognitive Skills    SUE Garay, CTRS    Pt did not attend 1330 cognitive skills group d/t resting in room despite staff invitation to attend. 1:1 talk time offered as alternative to group session, pt declined.           Signature:  Madeline Kim

## 2020-09-07 NOTE — PLAN OF CARE
Problem: Altered Mood, Deterioration in Function:  Goal: Ability to perform activities of daily living will improve  Description: Ability to perform activities of daily living will improve  9/7/2020 1344 by Taylor Kehr, RN  Outcome: Ongoing  Note: Mr. Ezra Carrillo states he feels overwhelmed at times. He denies thoughts of harm to self or others. Visual safety checks are completed every fifteen minutes.    9/7/2020 0251 by Goran Miller, RN  Outcome: Ongoing  Goal: Maintenance of adequate nutrition will improve  Description: Maintenance of adequate nutrition will improve  Outcome: Ongoing     Problem: Falls - Risk of:  Goal: Will remain free from falls  Description: Will remain free from falls  9/7/2020 1344 by Taylor Kehr, RN  Outcome: Ongoing  9/7/2020 0251 by Goran Miller RN  Outcome: Ongoing     Problem: Serum Glucose Level - Abnormal:  Goal: Ability to maintain appropriate glucose levels will improve  Description: Ability to maintain appropriate glucose levels will improve  Outcome: Ongoing     Problem: Gas Exchange - Impaired:  Goal: Levels of oxygenation will improve  Description: Levels of oxygenation will improve  Outcome: Ongoing

## 2020-09-07 NOTE — PROGRESS NOTES
HEMODIALYSIS PRE-TREATMENT NOTE    Patient Identifiers prior to treatment: Name,  and ID band     Isolation Required: yes                      Isolation Type: contact       (please document if patient is being managed as a PUI/COVID-19 patient)        Hepatitis status:                           Date Drawn                             Result  Hepatitis B Surface Antigen 19 Negative         Hepatitis B Surface Antibody 19 60.75        Hepatitis B Core Antibody na na          How was Hepatitis Status verified: Epic      Was a copy of the labs you documented provided to facility for the patient's chart: yes     Hemodialysis orders verified: yes     Access Within normal limits ( I.e. s/s of infection,...): WNL      Pre-Assessment completed: Yes     Pre-dialysis report received from: Natalie Amos RN                       Time: 523

## 2020-09-07 NOTE — PROGRESS NOTES
Reason for Follow up:  ESRD    HISTORY:    SBP elevated today  No new events noted     Review Of Systems:   Constitutional: No fever, chills, lethargy, weakness or wt loss  Cardiac:  No chest pain, dyspnea, orthopnea or PND. Chest:      + cough,No phlegm or wheezing. Abdomen:  No abdominal pain, nausea, vomiting or diarrhea. Neuro:  No gross focal weakness, numbness, abnormal movements or seizure like activity. Skin:   No rashes or itching. :   No hematuria, pyuria, dysuria or flank pain. Extremities:  No swelling or joint pains. Scheduled Meds:   lisinopril  20 mg Oral Daily    lidocaine  1 patch Transdermal Daily    sertraline  25 mg Oral Daily    ipratropium-albuterol  1 ampule Inhalation Q4H WA    insulin lispro  0-6 Units Subcutaneous TID WC    insulin lispro  0-3 Units Subcutaneous Nightly    allopurinol  100 mg Oral Daily    amLODIPine  10 mg Oral Nightly    calcitRIOL  0.25 mcg Oral Once per day on Mon Wed Fri    sevelamer  4,000 mg Oral TID WC    Roflumilast  500 mcg Oral Daily    tamsulosin  0.4 mg Oral Daily    budesonide-formoterol  2 puff Inhalation Q12H    montelukast  10 mg Oral Nightly     Continuous Infusions:   dextrose       PRN Meds:glucose, dextrose, glucagon (rDNA), dextrose, albuterol, diphenhydrAMINE, acetaminophen, diphenhydrAMINE, haloperidol lactate, haloperidol, hydrOXYzine, LORazepam, polyethylene glycol, traZODone, benzocaine-menthol, LORazepam    Allergies   Allergen Reactions    Penicillins        Physical Exam:    Vitals:    09/07/20 1230 09/07/20 1300 09/07/20 1330 09/07/20 1400   BP: (!) 171/87 (!) 166/80 (!) 160/86 (!) 171/89   Pulse: 83 85 83 88   Resp:       Temp:       TempSrc:       SpO2:       Weight:       Height:         No intake/output data recorded. General:  Awake, alert, not in distress. Appears to be stated age. HEENT: Atraumatic, normocephalic. Anicteric sclera. Pink and moist oral mucosa. Neck supple. No JVD.   Chest: Bilateral air entry, clear to auscultation, no wheezing, rhonchi or rales. Cardiovascular: RRR, S1S2, no murmur, rub or gallop. No lower extremity edema. Abdomen: Soft, non tender to palpation. Active bowel sounds x 4 quadrants. Musculoskeletal: Active ROM x 4 extremities. No cyanosis or clubbing. Integumentary: Pink, warm and dry. Free from rash or lesions. Skin turgor normal.  CNS: Oriented to person, place and time. Speech clear. Face symmetrical. No tremor.      Data:    CBC:   Lab Results   Component Value Date    WBC 7.2 09/02/2020    HGB 12.2 (L) 09/02/2020    HCT 36.7 (L) 09/02/2020    .1 (H) 09/02/2020     09/02/2020     BMP:    Lab Results   Component Value Date     09/07/2020     09/05/2020     (L) 09/04/2020    K 5.4 (H) 09/07/2020    K 4.5 09/05/2020    K 5.6 (H) 09/04/2020    CL 95 (L) 09/07/2020    CL 97 (L) 09/05/2020    CL 90 (L) 09/04/2020    CO2 26 09/07/2020    CO2 27 09/05/2020    CO2 26 09/04/2020    BUN 22 (H) 09/05/2020    BUN 60 (H) 09/04/2020    BUN 34 (H) 09/02/2020    CREATININE 4.33 (H) 09/05/2020    CREATININE 7.69 (HH) 09/04/2020    CREATININE 5.17 (HH) 09/02/2020    GLUCOSE 130 (H) 09/05/2020    GLUCOSE 122 (H) 09/04/2020    GLUCOSE 184 (H) 09/02/2020     CMP:   Lab Results   Component Value Date     09/07/2020    K 5.4 09/07/2020    CL 95 09/07/2020    CO2 26 09/07/2020    BUN 22 09/05/2020    CREATININE 4.33 09/05/2020    GLUCOSE 130 09/05/2020    GLUCOSE 112 10/07/2011    CALCIUM 9.2 09/05/2020    PROT 6.9 02/12/2019    LABALBU 3.8 02/12/2019    BILITOT 0.29 02/12/2019    ALKPHOS 124 02/12/2019    AST 19 02/12/2019    ALT 14 02/12/2019      Hepatic:   Lab Results   Component Value Date    AST 19 02/12/2019    AST 17 08/16/2018    AST 13 07/26/2017    ALT 14 02/12/2019    ALT 9 08/16/2018    ALT 7 07/26/2017    BILITOT 0.29 (L) 02/12/2019    BILITOT 0.18 (L) 08/16/2018    BILITOT 0.20 (L) 07/26/2017    ALKPHOS 124 02/12/2019    ALKPHOS 188 (H) 08/16/2018

## 2020-09-07 NOTE — PROGRESS NOTES
HEMODIALYSIS POST TREATMENT NOTE    Treatment time ordered: 3.5 hrs   Actual treatment time: 3.5 hrs     UltraFiltration Goal: 2000 mL  UltraFiltration Removed: 2000 mL      Pre Treatment weight: 74.5 kg   Post Treatment weight: 72.9 kg   Estimated Dry Weight: na    Access used:     Central Venous Catheter: na     Internal Access: Right Forearm AF       Access Flow: good      Sign and symptoms of infection: none       If YES: na     Medications or blood products given: Ativan 2 mg PO administered due to anxiety and restlessness; intervention successful     Regular outpatient schedule: MWF    Summary of response to treatment: pt tolerated tx well     Explain if orders NOT met, was physician notified:aiden      ACES flowsheet faxed to patient unit/ placed in patient chart: yes     Post assessment completed: yes     Report given to: Camilo Mckenna RN       * Intra-treatment documented Safety Checks include the followin) Access and face visible at all times. 2) All connections and blood lines are secure with no kinks. 3) NVL alarm engaged. 4) Hemosafe device applied (if applicable). 5) No collapse of Arterial or Venous blood chambers. 6) All blood lines / pump segments in the air detectors.

## 2020-09-07 NOTE — PROGRESS NOTES
09/02/2020 98  75 - 110 mg/dL Final    POC Glucose 09/02/2020 73* 75 - 110 mg/dL Final    Hemoglobin A1C 09/03/2020 5.7  4.0 - 6.0 % Final    Estimated Avg Glucose 09/03/2020 117  mg/dL Final    Comment: The ADA and AACC recommend providing the estimated average glucose result to permit better   patient understanding of their HBA1c result.  Cholesterol, Fasting 09/03/2020 156  <200 mg/dL Final    Comment:    Cholesterol Guidelines:      <200  Desirable   200-240  Borderline      >240  Undesirable         HDL 09/03/2020 57  >40 mg/dL Final    Comment:    HDL Guidelines:    <40     Undesirable   40-59    Borderline    >59     Desirable         LDL Cholesterol 09/03/2020 79  0 - 130 mg/dL Final    Comment:    LDL Guidelines:     <100    Desirable   100-129   Near to/above Desirable   130-159   Borderline      >159   Undesirable     Direct (measured) LDL and calculated LDL are not interchangeable tests.  Chol/HDL Ratio 09/03/2020 2.7  <5 Final            Triglyceride, Fasting 09/03/2020 101  <150 mg/dL Final    Comment:    Triglyceride Guidelines:     <150   Desirable   150-199  Borderline   200-499  High     >499   Very high   Based on AHA Guidelines for fasting triglyceride, October 2012.          VLDL 09/03/2020 NOT REPORTED  1 - 30 mg/dL Final    Uric Acid 09/03/2020 4.6  3.4 - 7.0 mg/dL Final    Glucose 09/02/2020 184* 70 - 99 mg/dL Final    BUN 09/02/2020 34* 6 - 20 mg/dL Final    CREATININE 09/02/2020 5.17* 0.70 - 1.20 mg/dL Final    Bun/Cre Ratio 09/02/2020 NOT REPORTED  9 - 20 Final    Calcium 09/02/2020 8.6  8.6 - 10.4 mg/dL Final    Sodium 09/02/2020 134* 135 - 144 mmol/L Final    Potassium 09/02/2020 4.1  3.7 - 5.3 mmol/L Final    Chloride 09/02/2020 90* 98 - 107 mmol/L Final    CO2 09/02/2020 31  20 - 31 mmol/L Final    Anion Gap 09/02/2020 13  9 - 17 mmol/L Final    GFR Non- 09/02/2020 12* >60 mL/min Final    GFR  09/02/2020 14* >60 mL/min Final  GFR Comment 09/02/2020        Final    Comment: Average GFR for 52-63 years old:   80 mL/min/1.73sq m  Chronic Kidney Disease:   <60 mL/min/1.73sq m  Kidney failure:   <15 mL/min/1.73sq m              eGFR calculated using average adult body mass.  Additional eGFR calculator available at:        [employer]            GFR Staging 09/02/2020 NOT REPORTED   Final    WBC 09/02/2020 7.2  3.5 - 11.0 k/uL Final    RBC 09/02/2020 3.63* 4.5 - 5.9 m/uL Final    Hemoglobin 09/02/2020 12.2* 13.5 - 17.5 g/dL Final    Hematocrit 09/02/2020 36.7* 41 - 53 % Final    MCV 09/02/2020 101.1* 80 - 100 fL Final    MCH 09/02/2020 33.7  26 - 34 pg Final    MCHC 09/02/2020 33.3  31 - 37 g/dL Final    RDW 09/02/2020 14.3  11.5 - 14.9 % Final    Platelets 64/11/4985 176  150 - 450 k/uL Final    MPV 09/02/2020 7.1  6.0 - 12.0 fL Final    NRBC Automated 09/02/2020 NOT REPORTED  per 100 WBC Final    Differential Type 09/02/2020 NOT REPORTED   Final    Seg Neutrophils 09/02/2020 81* 36 - 66 % Final    Lymphocytes 09/02/2020 10* 24 - 44 % Final    Monocytes 09/02/2020 6  1 - 7 % Final    Eosinophils % 09/02/2020 2  0 - 4 % Final    Basophils 09/02/2020 1  0 - 2 % Final    Immature Granulocytes 09/02/2020 NOT REPORTED  0 % Final    Segs Absolute 09/02/2020 5.90  1.3 - 9.1 k/uL Final    Absolute Lymph # 09/02/2020 0.70* 1.0 - 4.8 k/uL Final    Absolute Mono # 09/02/2020 0.40  0.1 - 1.3 k/uL Final    Absolute Eos # 09/02/2020 0.20  0.0 - 0.4 k/uL Final    Basophils Absolute 09/02/2020 0.00  0.0 - 0.2 k/uL Final    Absolute Immature Granulocyte 09/02/2020 NOT REPORTED  0.00 - 0.30 k/uL Final    WBC Morphology 09/02/2020 NOT REPORTED   Final    RBC Morphology 09/02/2020 NOT REPORTED   Final    Platelet Estimate 29/85/8104 NOT REPORTED   Final    POC Glucose 09/02/2020 73* 75 - 110 mg/dL Final    Phosphorus 09/02/2020 3.9  2.5 - 4.5 mg/dL Final    POC Glucose 09/03/2020 89  75 - 110 mg/dL Final    Glucose 09/04/2020 122* 70 - 99 mg/dL Final    BUN 09/04/2020 60* 6 - 20 mg/dL Final    CREATININE 09/04/2020 7.69* 0.70 - 1.20 mg/dL Final    Bun/Cre Ratio 09/04/2020 NOT REPORTED  9 - 20 Final    Calcium 09/04/2020 9.5  8.6 - 10.4 mg/dL Final    Sodium 09/04/2020 132* 135 - 144 mmol/L Final    Potassium 09/04/2020 5.6* 3.7 - 5.3 mmol/L Final    Chloride 09/04/2020 90* 98 - 107 mmol/L Final    CO2 09/04/2020 26  20 - 31 mmol/L Final    Anion Gap 09/04/2020 16  9 - 17 mmol/L Final    GFR Non- 09/04/2020 7* >60 mL/min Final    GFR  09/04/2020 9* >60 mL/min Final    GFR Comment 09/04/2020        Final    Comment: Average GFR for 52-63 years old:   80 mL/min/1.73sq m  Chronic Kidney Disease:   <60 mL/min/1.73sq m  Kidney failure:   <15 mL/min/1.73sq m              eGFR calculated using average adult body mass.  Additional eGFR calculator available at:        [employer]            GFR Staging 09/04/2020 NOT REPORTED   Final    POC Glucose 09/03/2020 107  75 - 110 mg/dL Final    POC Glucose 09/03/2020 92  75 - 110 mg/dL Final    POC Glucose 09/04/2020 102  75 - 110 mg/dL Final    POC Glucose 09/04/2020 107  75 - 110 mg/dL Final    Glucose 09/05/2020 130* 70 - 99 mg/dL Final    BUN 09/05/2020 22* 6 - 20 mg/dL Final    CREATININE 09/05/2020 4.33* 0.70 - 1.20 mg/dL Final    Bun/Cre Ratio 09/05/2020 NOT REPORTED  9 - 20 Final    Calcium 09/05/2020 9.2  8.6 - 10.4 mg/dL Final    Sodium 09/05/2020 137  135 - 144 mmol/L Final    Potassium 09/05/2020 4.5  3.7 - 5.3 mmol/L Final    Chloride 09/05/2020 97* 98 - 107 mmol/L Final    CO2 09/05/2020 27  20 - 31 mmol/L Final    Anion Gap 09/05/2020 13  9 - 17 mmol/L Final    GFR Non- 09/05/2020 14* >60 mL/min Final    GFR  09/05/2020 17* >60 mL/min Final    GFR Comment 09/05/2020        Final    Comment: Average GFR for 52-63 years old:   80 mL/min/1.73sq m  Chronic Kidney Disease:   <60 mL/min/1.73sq m  Kidney failure:   <15 mL/min/1.73sq m              eGFR calculated using average adult body mass.  Additional eGFR calculator available at:        [employer]            GFR Staging 09/05/2020 NOT REPORTED   Final    Magnesium 09/05/2020 2.2  1.6 - 2.6 mg/dL Final    Phosphorus 09/05/2020 5.2* 2.5 - 4.5 mg/dL Final    POC Glucose 09/04/2020 118* 75 - 110 mg/dL Final    POC Glucose 09/04/2020 203* 75 - 110 mg/dL Final    POC Glucose 09/05/2020 85  75 - 110 mg/dL Final    POC Glucose 09/05/2020 90  75 - 110 mg/dL Final    POC Glucose 09/05/2020 76  75 - 110 mg/dL Final    Magnesium 09/06/2020 2.6  1.6 - 2.6 mg/dL Final    Phosphorus 09/06/2020 5.8* 2.5 - 4.5 mg/dL Final    POC Glucose 09/05/2020 80  75 - 110 mg/dL Final    POC Glucose 09/06/2020 87  75 - 110 mg/dL Final    POC Glucose 09/06/2020 95  75 - 110 mg/dL Final    POC Glucose 09/06/2020 167* 75 - 110 mg/dL Final    POC Glucose 09/06/2020 64* 75 - 110 mg/dL Final            Medications  Current Facility-Administered Medications: [START ON 9/7/2020] lisinopril (PRINIVIL;ZESTRIL) tablet 20 mg, 20 mg, Oral, Daily  lidocaine 4 % external patch 1 patch, 1 patch, Transdermal, Daily  sertraline (ZOLOFT) tablet 25 mg, 25 mg, Oral, Daily  glucose (GLUTOSE) 40 % oral gel 15 g, 15 g, Oral, PRN  dextrose 50 % IV solution, 12.5 g, Intravenous, PRN  glucagon (rDNA) injection 1 mg, 1 mg, Intramuscular, PRN  dextrose 5 % solution, 100 mL/hr, Intravenous, PRN  ipratropium-albuterol (DUONEB) nebulizer solution 1 ampule, 1 ampule, Inhalation, Q4H WA  albuterol (PROVENTIL) nebulizer solution 2.5 mg, 2.5 mg, Nebulization, Q6H PRN  diphenhydrAMINE (BENADRYL) tablet 25 mg, 25 mg, Oral, Q6H PRN  insulin lispro (HUMALOG) injection vial 0-6 Units, 0-6 Units, Subcutaneous, TID WC  insulin lispro (HUMALOG) injection vial 0-3 Units, 0-3 Units, Subcutaneous, Nightly  allopurinol (ZYLOPRIM) tablet 100 mg, 100 mg, Oral, Daily  amLODIPine (NORVASC) tablet 10 mg, 10 mg, Oral, Nightly  calcitRIOL (ROCALTROL) capsule 0.25 mcg, 0.25 mcg, Oral, Once per day on Mon Wed Fri  sevelamer (RENVELA) tablet 4,000 mg, 4,000 mg, Oral, TID WC  Roflumilast (DALIRESP) tablet 500 mcg, 500 mcg, Oral, Daily  tamsulosin (FLOMAX) capsule 0.4 mg, 0.4 mg, Oral, Daily  budesonide-formoterol (SYMBICORT) 160-4.5 MCG/ACT inhaler 2 puff, 2 puff, Inhalation, Q12H  montelukast (SINGULAIR) tablet 10 mg, 10 mg, Oral, Nightly  acetaminophen (TYLENOL) tablet 650 mg, 650 mg, Oral, Q4H PRN  diphenhydrAMINE (BENADRYL) injection 50 mg, 50 mg, Intramuscular, Q4H PRN  haloperidol lactate (HALDOL) injection 5 mg, 5 mg, Intramuscular, Q4H PRN  haloperidol (HALDOL) tablet 5 mg, 5 mg, Oral, Q4H PRN  hydrOXYzine (ATARAX) tablet 50 mg, 50 mg, Oral, TID PRN  LORazepam (ATIVAN) injection 2 mg, 2 mg, Intramuscular, Q4H PRN  polyethylene glycol (GLYCOLAX) packet 17 g, 17 g, Oral, Daily PRN  traZODone (DESYREL) tablet 50 mg, 50 mg, Oral, Nightly PRN  benzocaine-menthol (CEPACOL SORE THROAT) lozenge 1 lozenge, 1 lozenge, Oral, Q2H PRN  LORazepam (ATIVAN) tablet 2 mg, 2 mg, Oral, Q4H PRN    ASSESSMENT  Schizoaffective disorder (HCC)     PLAN  Patient s symptoms   are improving  Will continue current medications  Attempt to develop insight  Psycho-education conducted. Supportive Therapy conducted. Probable discharge is monday  Follow-up TBD    More than 16 mins of the session was spent doing Supportive psychotherapy. Session started at 2pm and ended at 2:30pm.     Electronically signed by Jose Daniel Byrnes MD on 9/6/20 at 9:10 PM EDT    **This report has been created using voice recognition software. It may contain minor errors which are inherent in voice recognition technology. **

## 2020-09-07 NOTE — PLAN OF CARE
Problem: Musculor/Skeletal Functional Status  Goal: Absence of falls  Outcome: Ongoing     Problem: Falls - Risk of:  Goal: Will remain free from falls  Description: Will remain free from falls  Outcome: Ongoing     Problem: Altered Mood, Deterioration in Function:  Goal: Ability to perform activities of daily living will improve  Description: Ability to perform activities of daily living will improve  Outcome: Ongoing     Patient denies suicidal homicidal ideations this shift, denies hallucinations. Patient is out in the day area selectively social with peers. Patient is accepting of medication and breathing treatments for respiratory difficulty but refuses MDI. Patient is accepting of POCT, blood sugar initially low, snacks and juice given, repeat blood sugar  was 89. Patient displays difficulty with sleep due to restlessness and difficulty with breathing. Patient refuses nasal cannula. Patient remains a 1:1 for fall risk.

## 2020-09-07 NOTE — PROGRESS NOTES
[facility] [facility]    Physical Therapy Progress Note    Date: 20  Patient Name: Kerri Herbert       Room: 7229/4739-97  MRN: [id number]   Account: [de-identified]   : 1965  (53 y.o.)   Gender: male     Discharge Recommendations   Patient would benefit from continued therapy after discharge  Equipment Needed: No    Referring Practitioner: Dylan Gardner MD  Diagnosis: Schizoaffective disorder  Restrictions/Precautions: General Precautions, Up as Tolerated, Fall Risk  Implants present? : (cochlear implant R ear)  Other position/activity restrictions: 1:1 sitter in place upon exit/entry. Pt on dialysis 4x weekly (,,,FRI). Pt utilizes supplemental O2 during the day/at night as needed. Past Medical History:  has a past medical history of Anemia in chronic kidney disease (CKD), Aortic valve stenosis, Chronic kidney disease, COPD (chronic obstructive pulmonary disease) ([facility] [address] Depression, Diabetes mellitus ([facility] [address] ESRD (end stage renal disease) ([facility] [address] Hyperpotassemia, Hypertension, MRSA (methicillin resistant staph aureus) culture positive, and Schizoaffective disorder ([facility] [address] Past Surgical History:   has a past surgical history that includes AV fistula repair. Additional Pertinent Hx: Kerri Herbert is 53 y.o.,  male, admitted because of Schizoaffective Disorder. According to ED notes, pt comes from a nursing facility and has been reported as being argumentative with staff, refusing to take his medications and unable to care  for himself. Patient is refusing to return back to nursing home. Patient not answering questions appropriately. Pt is in a wheelchair and is on 1:1 watch for safety. Patient has hx of ESRD and reports in on dialysis 4 times a week. Overall Orientation Status: Impaired  Orientation Level: Oriented to person, Oriented to place, Disoriented to situation, Disoriented to time  Restrictions/Precautions  Restrictions/Precautions: General Precautions; Up as Tolerated; Fall Risk  Required Braces or Orthoses?: No  Implants present? : (cochlear implant R ear)  Position Activity Restriction  Other position/activity restrictions: 1:1 sitter in place upon exit/entry. Pt on dialysis 4x weekly (M,T,TH,FRI). Pt utilizes supplemental O2 during the day/at night as needed. Subjective: Pt states he is waiting for his doctor \"it's complicated and very deep\" (not sure the statement and what pt meant. When asking pt if pt was in pain, pt replied \"Not really. My neck is. Elen Simpler Elen Simpler Elen Simpler \" and pt didn't finish the statement even when asked again. Comments: Pt is sitting in common area with 1:1 Sitter. Pt is aloof at first however willing to do minimal activity. Vital Signs  Patient Currently in Pain: Yes  Pain Assessment: Faces(pt wouldn't give a number on pain scale)  Urrutia-Hood Pain Rating: Hurts a little bit(pt does smile at times and sings.)  Pain Type: Chronic pain  Pain Location: Neck  Pain Orientation: Left  Clinical Progression: Not changed  Non-Pharmaceutical Pain Intervention(s): Ambulation/Increased Activity; Distraction  Response to Pain Intervention: None                Bed Mobility:   Rolling: Unable to assess  Supine to Sit: Unable to assess  Sit to Supine: Unable to assess  Scooting: Unable to assess  Comment: Pt out of bed upon arrival and exit, unable to assess at this time      Transfers:  Sit to Stand: Unable to assess  Stand to sit: Unable to assess  Bed to Chair: Unable to assess  Comment: Pt deferred all transfers. Stairs/Curb  Stairs?: No              Wheelchair Activities  Propulsion: Yes  Propulsion 1  Propulsion: Manual  Level: Level Tile  Method: RLE;LLE  Level of Assistance: Independent  Description/ Details: Propelling without difficulty bilat feet. Pt able to perform straight path and 2 turns 180 degrees. Distance: 200ft            Sitting - Static: Good  Sitting - Dynamic: Good  Comments: Pt unwilling to stand at this time.  Seated assessed from w/c     Other exercises?: Yes  Other exercises 1: Seated bilat LE exercises, x10  Other exercises 2: Education on mobility and practicing transfers frequently to improve overall safety and stability           Activity Tolerance: Patient limited by fatigue;Patient limited by endurance; Patient limited by pain  PT Equipment Recommendations  Equipment Needed: No       Assessment  Activity Tolerance: Patient limited by fatigue;Patient limited by endurance; Patient limited by pain   Body structures, Functions, Activity limitations: Decreased functional mobility ; Decreased safe awareness;Decreased balance;Decreased endurance;Decreased strength  Prognosis: Good  Discharge Recommendations: Patient would benefit from continued therapy after discharge     Type of devices: Patient at risk for falls; All fall risk precautions in place;Call light within reach; Sitter present;Nurse notified(left in W/C in common area w/ sitter)     Plan  Times per week: 2 X week for 1 week  Times per day: (2 X week for 1 week)  Current Treatment Recommendations: Strengthening, Transfer Training, Patient/Caregiver Education & Training, Equipment Evaluation, Education, & procurement, Balance Training, Gait Training, Positioning, Safety Education & Training, Functional Mobility Training    Patient Education  New Education Provided:  Importance of Practicing Transfers  Learner:patient  Method: demonstration and explanation       Outcome: needs reinforcement     Goals  Short term goals  Time Frame for Short term goals: 2 X week for 1 week  Short term goal 1: pt to tolerate 1/2 hour of therapuetic exercise  Short term goal 2: pt to demonstrate good technique for balance and LE strengthening  Short term goal 3: pt to demonstrate independent transfers  for sit <> stand and bed <> chair transfers using safe technique  Short term goal 4: pt to demonstrate gait w/ w walker 20-30' w/ min x 1 and W/C follow    PT Individual Minutes  Time In: 4675  Time Out: [address] [facility] [address]: 13    Electronically signed by Rowena Mccoy PTA on 9/7/20 at 11:20 AM EDT

## 2020-09-07 NOTE — GROUP NOTE
Group Therapy Note    Date: 9/7/2020    Group Start Time: 0900  Group End Time: 7991  Group Topic: Community Meeting    NKECHI Fernandez    Pt did not attend 0900 community meeting / goal setting  group d/t resting in room despite staff invitation to attend. 1:1 talk time offered as alternative to group session, pt declined.           Signature:  Luis Og

## 2020-09-08 VITALS
RESPIRATION RATE: 14 BRPM | DIASTOLIC BLOOD PRESSURE: 80 MMHG | SYSTOLIC BLOOD PRESSURE: 154 MMHG | OXYGEN SATURATION: 96 % | HEIGHT: 68 IN | BODY MASS INDEX: 24.36 KG/M2 | HEART RATE: 115 BPM | TEMPERATURE: 98.6 F | WEIGHT: 160.72 LBS

## 2020-09-08 LAB
ABSOLUTE EOS #: 0.1 K/UL (ref 0–0.4)
ABSOLUTE IMMATURE GRANULOCYTE: ABNORMAL K/UL (ref 0–0.3)
ABSOLUTE LYMPH #: 0.8 K/UL (ref 1–4.8)
ABSOLUTE MONO #: 0.6 K/UL (ref 0.1–1.3)
ANION GAP SERPL CALCULATED.3IONS-SCNC: 15 MMOL/L (ref 9–17)
BASOPHILS # BLD: 0 % (ref 0–2)
BASOPHILS ABSOLUTE: 0 K/UL (ref 0–0.2)
BUN BLDV-MCNC: 28 MG/DL (ref 6–20)
BUN/CREAT BLD: ABNORMAL (ref 9–20)
CALCIUM SERPL-MCNC: 9.3 MG/DL (ref 8.6–10.4)
CHLORIDE BLD-SCNC: 99 MMOL/L (ref 98–107)
CO2: 23 MMOL/L (ref 20–31)
CREAT SERPL-MCNC: 5.92 MG/DL (ref 0.7–1.2)
DIFFERENTIAL TYPE: ABNORMAL
EOSINOPHILS RELATIVE PERCENT: 1 % (ref 0–4)
GFR AFRICAN AMERICAN: 12 ML/MIN
GFR NON-AFRICAN AMERICAN: 10 ML/MIN
GFR SERPL CREATININE-BSD FRML MDRD: ABNORMAL ML/MIN/{1.73_M2}
GFR SERPL CREATININE-BSD FRML MDRD: ABNORMAL ML/MIN/{1.73_M2}
GLUCOSE BLD-MCNC: 90 MG/DL (ref 70–99)
GLUCOSE BLD-MCNC: 92 MG/DL (ref 75–110)
HCT VFR BLD CALC: 39.2 % (ref 41–53)
HEMOGLOBIN: 12.7 G/DL (ref 13.5–17.5)
IMMATURE GRANULOCYTES: ABNORMAL %
LYMPHOCYTES # BLD: 9 % (ref 24–44)
MAGNESIUM: 2.4 MG/DL (ref 1.6–2.6)
MCH RBC QN AUTO: 33.2 PG (ref 26–34)
MCHC RBC AUTO-ENTMCNC: 32.4 G/DL (ref 31–37)
MCV RBC AUTO: 102.5 FL (ref 80–100)
MONOCYTES # BLD: 6 % (ref 1–7)
NRBC AUTOMATED: ABNORMAL PER 100 WBC
PDW BLD-RTO: 14.4 % (ref 11.5–14.9)
PHOSPHORUS: 5.8 MG/DL (ref 2.5–4.5)
PLATELET # BLD: 207 K/UL (ref 150–450)
PLATELET ESTIMATE: ABNORMAL
PMV BLD AUTO: 6.2 FL (ref 6–12)
POTASSIUM SERPL-SCNC: 4.8 MMOL/L (ref 3.7–5.3)
RBC # BLD: 3.83 M/UL (ref 4.5–5.9)
RBC # BLD: ABNORMAL 10*6/UL
SEG NEUTROPHILS: 84 % (ref 36–66)
SEGMENTED NEUTROPHILS ABSOLUTE COUNT: 7.3 K/UL (ref 1.3–9.1)
SODIUM BLD-SCNC: 137 MMOL/L (ref 135–144)
WBC # BLD: 8.8 K/UL (ref 3.5–11)
WBC # BLD: ABNORMAL 10*3/UL

## 2020-09-08 PROCEDURE — 6360000002 HC RX W HCPCS: Performed by: NURSE PRACTITIONER

## 2020-09-08 PROCEDURE — 94640 AIRWAY INHALATION TREATMENT: CPT

## 2020-09-08 PROCEDURE — 99239 HOSP IP/OBS DSCHRG MGMT >30: CPT | Performed by: PSYCHIATRY & NEUROLOGY

## 2020-09-08 PROCEDURE — 6370000000 HC RX 637 (ALT 250 FOR IP): Performed by: PSYCHIATRY & NEUROLOGY

## 2020-09-08 PROCEDURE — 6370000000 HC RX 637 (ALT 250 FOR IP): Performed by: NURSE PRACTITIONER

## 2020-09-08 PROCEDURE — 6370000000 HC RX 637 (ALT 250 FOR IP): Performed by: INTERNAL MEDICINE

## 2020-09-08 PROCEDURE — 82947 ASSAY GLUCOSE BLOOD QUANT: CPT

## 2020-09-08 PROCEDURE — 85025 COMPLETE CBC W/AUTO DIFF WBC: CPT

## 2020-09-08 PROCEDURE — 90935 HEMODIALYSIS ONE EVALUATION: CPT

## 2020-09-08 PROCEDURE — 94761 N-INVAS EAR/PLS OXIMETRY MLT: CPT

## 2020-09-08 PROCEDURE — 80048 BASIC METABOLIC PNL TOTAL CA: CPT

## 2020-09-08 PROCEDURE — 84100 ASSAY OF PHOSPHORUS: CPT

## 2020-09-08 PROCEDURE — 36415 COLL VENOUS BLD VENIPUNCTURE: CPT

## 2020-09-08 PROCEDURE — 83735 ASSAY OF MAGNESIUM: CPT

## 2020-09-08 RX ORDER — LISINOPRIL 20 MG/1
20 TABLET ORAL DAILY
Qty: 30 TABLET | Refills: 0 | Status: SHIPPED | OUTPATIENT
Start: 2020-09-09

## 2020-09-08 RX ORDER — SERTRALINE HYDROCHLORIDE 25 MG/1
25 TABLET, FILM COATED ORAL DAILY
Qty: 30 TABLET | Refills: 0 | Status: SHIPPED | OUTPATIENT
Start: 2020-09-09

## 2020-09-08 RX ORDER — TRAZODONE HYDROCHLORIDE 50 MG/1
50 TABLET ORAL NIGHTLY PRN
Qty: 30 TABLET | Refills: 0 | Status: SHIPPED | OUTPATIENT
Start: 2020-09-08

## 2020-09-08 RX ORDER — HYDROXYZINE 50 MG/1
50 TABLET, FILM COATED ORAL 3 TIMES DAILY PRN
Qty: 45 TABLET | Refills: 0 | Status: SHIPPED | OUTPATIENT
Start: 2020-09-08 | End: 2020-09-23

## 2020-09-08 RX ADMIN — Medication 1 LOZENGE: at 07:26

## 2020-09-08 RX ADMIN — SEVELAMER CARBONATE 4000 MG: 800 TABLET, FILM COATED ORAL at 08:49

## 2020-09-08 RX ADMIN — ACETAMINOPHEN 650 MG: 325 TABLET, FILM COATED ORAL at 09:27

## 2020-09-08 RX ADMIN — ACETAMINOPHEN 650 MG: 325 TABLET, FILM COATED ORAL at 05:00

## 2020-09-08 RX ADMIN — LISINOPRIL 20 MG: 20 TABLET ORAL at 08:50

## 2020-09-08 RX ADMIN — SERTRALINE HYDROCHLORIDE 25 MG: 25 TABLET ORAL at 08:50

## 2020-09-08 RX ADMIN — TAMSULOSIN HYDROCHLORIDE 0.4 MG: 0.4 CAPSULE ORAL at 08:50

## 2020-09-08 RX ADMIN — Medication 1 LOZENGE: at 00:13

## 2020-09-08 RX ADMIN — ALBUTEROL SULFATE 2.5 MG: 2.5 SOLUTION RESPIRATORY (INHALATION) at 00:16

## 2020-09-08 RX ADMIN — IPRATROPIUM BROMIDE AND ALBUTEROL SULFATE 1 AMPULE: 2.5; .5 SOLUTION RESPIRATORY (INHALATION) at 07:38

## 2020-09-08 RX ADMIN — ALLOPURINOL 100 MG: 100 TABLET ORAL at 08:49

## 2020-09-08 RX ADMIN — ROFLUMILAST 500 MCG: 500 TABLET ORAL at 08:50

## 2020-09-08 RX ADMIN — Medication 1 LOZENGE: at 10:47

## 2020-09-08 ASSESSMENT — PAIN SCALES - GENERAL
PAINLEVEL_OUTOF10: 0
PAINLEVEL_OUTOF10: 3
PAINLEVEL_OUTOF10: 0
PAINLEVEL_OUTOF10: 0

## 2020-09-08 NOTE — GROUP NOTE
Group Therapy Note    Date: 9/8/2020    Group Start Time: 0900  Group End Time: 0749  Group Topic: Community Meeting    [place]        Group Therapy Note    Attendees: 6/20         Patient's Goal:  To increase social interaction and to explore and identify short term goals r/t wellness and recovery. RT discussed group schedule and unit structure/resources and encouraged pts to be engaged in their treatment. Pts were given opportunities to ask questions. Notes: Pt participated in group task and was able to identify short term goals r/t wellness and recovery. Pt is pleasant and supportive of peers        Status After Intervention:  Improved     Participation Level:  Active Listener and Interactive     Participation Quality: Appropriate, Attentive, Sharing         Speech:   Normal     Thought Process/Content: Logical,linear, able to share humor with group     Affective Functioning: Congruent     Mood: euthymic, humorous, smiling,very social        Level of consciousness:  Alert, and Attentive        Response to Learning: Able to verbalize current knowledge/experience, Able to verbalize/acknowledge new learning, and Progressing to goal        Endings: None Reported     Modes of Intervention: Education, Support, Socialization, Exploration, Clarifying and Problem-solving        Discipline Responsible: Psychoeducational Specialist        Signature:  NKECHI Munguia

## 2020-09-08 NOTE — CARE COORDINATION
SOCIAL SERVICE NOTE:  telephones PT's guardian Emily López on this date at [phone] to verify that PT can be transferred back to [facility] [address]. He reports that he has not heard anything from Patient's family reporting that [facility] verifies that PT can return to [facility] [address] on this date.

## 2020-09-08 NOTE — BH NOTE
Group Refusal     Pt refused to attend Wrap Up/Relaxation group at 20:30 despite encouragement from staff. 1:1 talk time offered as alternative.

## 2020-09-08 NOTE — BH NOTE
[address] [place] [address]  Discharge Note    Pt discharged with followings belongings: All Valuables sent home with patient. Security envelope number: patient   Patient went home by ambulance  Medications none  Denied suicidal thoughts or thoughts of harming others at time of D/C. Discharged to [facility]    Patient education on aftercare instructions, states understanding and signed discharge AVS, copy given to patient. Status EXAM upon discharge:  Status and Exam  Normal: No  Facial Expression: Flat, Expressionless, Sad  Affect: Appropriate  Level of Consciousness: Alert  Mood:Normal: No  Mood: Depressed, Sad  Motor Activity:Normal: No  Motor Activity: Decreased, Unusual Posture/Gait  Interview Behavior: Cooperative  Preception: [place] to Person, Shauna Brennan to Time, [place] to Place, [place] to Situation  Attention:Normal: No  Attention: Distractible  Thought Processes: Circumstantial, Blocking  Thought Content:Normal: No  Thought Content: Preoccupations  Hallucinations: None  Delusions: No  Memory:Normal: No  Memory: Poor Recent  Insight and Judgment: No  Insight and Judgment: Poor Insight, Unmotivated  Present Suicidal Ideation: No  Present Homicidal Ideation: No    [place] Hazard, [address] [address]  Discharge Note    Pt discharged with followings belongings: All Valuables sent home with patient. Security envelope number: patient   Patient went home by ambulance  Medications none  Denied suicidal thoughts or thoughts of harming others at time of D/C. Discharged to [facility]    Patient education on aftercare instructions, states understanding and signed discharge AVS, copy given to patient.          Status EXAM upon discharge:  Status and Exam  Normal: No  Facial Expression: Flat, Expressionless, Sad  Affect: Appropriate  Level of Consciousness: Alert  Mood:Normal: No  Mood: Depressed, Sad  Motor Activity:Normal: No  Motor Activity: Decreased, Unusual Posture/Gait  Interview Behavior: Cooperative  Preception: [place] to Person,  [place] to Time, [place] to Place, [place] to Situation  Attention:Normal: No  Attention: Distractible  Thought Processes: Circumstantial, Blocking  Thought Content:Normal: No  Thought Content: Preoccupations  Hallucinations: None  Delusions: No  Memory:Normal: No  Memory: Poor Recent  Insight and Judgment: No  Insight and Judgment: Poor Insight, Unmotivated  Present Suicidal Ideation: No  Present Homicidal Ideation: No   report called to nursing home and copy of CRF faxed.   Deirdre Dumas RN

## 2020-09-08 NOTE — GROUP NOTE
Group Therapy Note    Date: 9/8/2020    Group Start Time: 1000  Group End Time: 1030  Group Topic: Psychotherapy    [facility]    Helene Herrera        Group Therapy Note    Attendees: 4/10         Patient's Goal:  PT will demonstrate increased interpersonal interaction and a clear understanding on multiple types of coping skills relating to the here-and-now therapeutic practice. Notes:  :  Patient is making progress, AEB participating in group discussion, actively listening, and supporting other group members. PT participates in group and encourages others to participate     Status After Intervention:  Improved    Participation Level: Active Listener and Interactive    Participation Quality: Appropriate, Attentive and Sharing      Speech:  normal      Thought Process/Content: Logical      Affective Functioning: Flat      Mood: depressed      Level of consciousness:  Alert, Oriented x4 and Attentive      Response to Learning: Able to verbalize current knowledge/experience and Progressing to goal      Endings: None Reported    Modes of Intervention: Support, Socialization, Exploration, Clarifying and Problem-solving      Discipline Responsible: /Counselor      Signature:   Helene Herrera

## 2020-09-08 NOTE — BH NOTE
Patient given tobacco quitline number 91762662225 at this time, refusing to call at this time, states \" I just dont want to quit now\"- patient given information as to the dangers of long term tobacco use. Continue to reinforce the importance of tobacco cessation.

## 2020-09-09 NOTE — DISCHARGE SUMMARY
is Rohit Conti. Patient is also dealing with end-stage renal disease and is currently on dialysis MWF. Hospital Course:   Upon admission, Ratna Patton was provided a safe secure environment, introduced to unit milieu. Patient participated in groups and individual therapies. Meds were adjusted as noted below. He had no aggression on unit and responded well to low dose of Zoloft. After few days of hospital care, patient began to feel improvement. Depression lifted, thoughts to harm self ceased. Sleep improved, appetite was good. On morning rounds 9/8/2020, Ratna Patton endorses feeling ready for discharge. Patient denies suicidal or homicidal ideations, denies hallucinations or delusions. Denies SE's from meds. It was decided that maximum benefit from hospital care had been achieved and patient can be discharged. Consults:   Nephrology    Significant Diagnostic Studies: Routine labs and diagnostics    Treatments: Psychotropic medications, therapy with group, milieu, and 1:1 with nurses, social workers, PA-C/CNP, and Attending physician.       Discharge Medications:  Discharge Medication List as of 9/8/2020 10:25 AM      START taking these medications    Details   sertraline (ZOLOFT) 25 MG tablet Take 1 tablet by mouth daily, Disp-30 tablet,R-0Normal      traZODone (DESYREL) 50 MG tablet Take 1 tablet by mouth nightly as needed for Sleep, Disp-30 tablet,R-0Normal         CONTINUE these medications which have CHANGED    Details   hydrOXYzine (ATARAX) 50 MG tablet Take 1 tablet by mouth 3 times daily as needed for Anxiety, Disp-45 tablet,R-0Normal      lisinopril (PRINIVIL;ZESTRIL) 20 MG tablet Take 1 tablet by mouth daily, Disp-30 tablet,R-0Normal         CONTINUE these medications which have NOT CHANGED    Details   allopurinol (ZYLOPRIM) 100 MG tablet Take 100 mg by mouth dailyHistorical Med      amLODIPine (NORVASC) 10 MG tablet Take 10 mg by mouth nightlyHistorical Med      calcitRIOL (ROCALTROL) 0.25 MCG capsule Take 0.25 mcg by mouth three times a week On Mondays, Wednesdays, and FridaysHistorical Med      Roflumilast (DALIRESP) 500 MCG tablet Take 500 mcg by mouth dailyHistorical Med      montelukast (SINGULAIR) 10 MG tablet Take 10 mg by mouth nightlyHistorical Med      sevelamer (RENVELA) 800 MG tablet Take 5 tablets by mouth 3 times daily (with meals)Historical Med      tamsulosin (FLOMAX) 0.4 MG capsule Take 0.4 mg by mouth dailyHistorical Med      B Complex-C-Folic Acid (VIRT-CAPS PO) Take 1 capsule by mouth dailyHistorical Med      lidocaine (ASPERCREME LIDOCAINE) 4 % external patch Place 1 patch onto the skin as needed 1 patch topically as needed, Transdermal, PRN, Historical Med      polyethylene glycol (GLYCOLAX) 17 g packet Take 17 g by mouth daily as needed for ConstipationHistorical Med      ipratropium-albuterol (DUONEB) 0.5-2.5 (3) MG/3ML SOLN nebulizer solution Inhale 1 vial into the lungs every 6 hoursHistorical Med      diphenhydrAMINE (BENADRYL) 25 MG tablet Take 25 mg by mouth every 6 hours as needed for Allergies      gemfibrozil (LOPID) 600 MG tablet Take 600 mg by mouth 2 times daily Historical Med      Menthol (HALLS COUGH DROPS MT) Take by mouth as needed (cough)         STOP taking these medications       escitalopram (LEXAPRO) 5 MG tablet Comments:   Reason for Stopping:         Pollen Extracts (PROSTAT PO) Comments:   Reason for Stopping:         midodrine (PROAMATINE) 5 MG tablet Comments:   Reason for Stopping:         ARIPiprazole (ABILIFY) 5 MG tablet Comments:   Reason for Stopping:         carvedilol (COREG) 25 MG tablet Comments:   Reason for Stopping:         folic acid (FOLVITE) 1 MG tablet Comments:   Reason for Stopping:         ipratropium (ATROVENT) 0.06 % nasal spray Comments:   Reason for Stopping:         lactulose (CHRONULAC) 10 GM/15ML solution Comments:   Reason for Stopping:         magnesium oxide (MAG-OX) 400 MG tablet Comments:   Reason for Stopping:         QUEtiapine (SEROQUEL) 200 MG tablet Comments:   Reason for Stopping:         senna (SENOKOT) 8.6 MG tablet Comments:   Reason for Stopping:         valproic acid (DEPACON) 250 MG/5ML SOLN oral solution Comments:   Reason for Stopping:         sodium zirconium cyclosilicate (LOKELMA) 5 g PACK oral suspension Comments:   Reason for Stopping:         vitamin B-12 (CYANOCOBALAMIN) 500 MCG tablet Comments:   Reason for Stopping:         Cholecalciferol (VITAMIN D3) 50 MCG (2000 UT) CAPS Comments:   Reason for Stopping:         Sodium Polystyrene Sulfonate (KAYEXALATE PO) Comments:   Reason for Stopping:         Acetaminophen 500 MG CAPS Comments:   Reason for Stopping:         bisacodyl (BISAC-EVAC) 10 MG suppository Comments:   Reason for Stopping:         iloperidone (FANAPT) 4 MG TABS tablet Comments:   Reason for Stopping:         PARoxetine (PAXIL) 20 MG tablet Comments:   Reason for Stopping:         atorvastatin (LIPITOR) 20 MG tablet Comments:   Reason for Stopping:         pantoprazole (PROTONIX) 40 MG tablet Comments:   Reason for Stopping:         predniSONE (DELTASONE) 20 MG tablet Comments:   Reason for Stopping:         budesonide-formoterol (SYMBICORT) 160-4.5 MCG/ACT AERO Comments:   Reason for Stopping:         LORazepam (ATIVAN) 0.5 MG tablet Comments:   Reason for Stopping:         bumetanide (BUMEX) 2 MG tablet Comments:   Reason for Stopping:         divalproex (DEPAKOTE SPRINKLE) 125 MG capsule Comments:   Reason for Stopping:         calcium acetate (PHOSLO) 667 MG capsule Comments:   Reason for Stopping:         Omega-3 Fatty Acids (FISH OIL) 1000 MG CAPS Comments:   Reason for Stopping:         polyvinyl alcohol (LIQUIFILM TEARS) 1.4 % ophthalmic solution Comments:   Reason for Stopping:         metoprolol (TOPROL XL) 25 MG XL tablet Comments:   Reason for Stopping:         omeprazole (PRILOSEC) 20 MG capsule Comments:   Reason for Stopping:         QUEtiapine (SEROQUEL) 300 MG tablet Comments:   Reason for Stopping:         NONFORMULARY Comments:   Reason for Stopping:         Vitamin D (CHOLECALCIFEROL) 1000 UNITS CAPS capsule Comments:   Reason for Stopping:         haloperidol (HALDOL) 2 MG tablet Comments:   Reason for Stopping:         HYDROcodone-acetaminophen (NORCO) 5-325 MG per tablet Comments:   Reason for Stopping:         tolnaftate (TINACTIN) 1 % cream Comments:   Reason for Stopping:         hydrocortisone 1 % cream Comments:   Reason for Stopping:              Discharge Exam:  Level of consciousness:  Within normal limits  Appearance: Street clothes, seated, with good grooming  Behavior/Motor: No abnormalities noted  Attitude toward examiner:  Cooperative, attentive, good eye contact  Speech:  spontaneous, normal rate, normal volume and well articulated  Mood:  euthymic  Affect:  Full range  Thought processes:  linear, goal directed and coherent  Thought content:  denies homicidal ideation  Suicidal Ideation:  denies suicidal ideation  Delusions:  no evidence of delusions  Perceptual Disturbance:  denies any perceptual disturbance  Cognition:  Intact  Memory: age appropriate  Insight & Judgement: fair  Medication side effects: denies     Disposition: home    Patient Instructions: Activity: activity as tolerated  1. Patient instructed to take medications regularly and follow up with outpatient appointments. Follow-up as scheduled with Outpatient mental health clinic      Signed:    Electronically signed by Dory Reyna MD on 9/8/20 at 8:31 PM EDT    Time Spent on discharge is more than 35 minutes in the examination, evaluation, counseling and review of medications and discharge plan. Home Foss is a 53 y.o. male being evaluated by a Virtual Visit (video visit) encounter to address concerns as mentioned above. A caregiver was present in the room along with the patient.  Pursuant to the emergency declaration under the [address] Emergencies Act, 1135 waiver authority and the Coronavirus Preparedness and Response Supplemental Appropriations Act, this Virtual Visit was conducted with patient's (and/or legal guardian's) consent, to reduce the patient's risk of exposure to COVID-19 and provide necessary medical care. Services were provided through a video synchronous discussion virtually to substitute for in-person visit by provider. Patient is present at [address] [place] [address] on [address] and I am physically present at my home in [facility]     --Cris Edwards MD on 9/8/2020 at 8:31 PM    An electronic signature was used to authenticate this note. **This report has been created using voice recognition software. It may contain minor errors which are inherent in voice recognition technology. **

## 2020-11-13 ENCOUNTER — HOSPITAL ENCOUNTER (OUTPATIENT)
Age: 55
Setting detail: SPECIMEN
Discharge: HOME | End: 2020-11-13
Payer: MEDICARE

## 2020-11-13 LAB
ANION GAP SERPL CALCULATED.3IONS-SCNC: 15 MMOL/L (ref 9–17)
BUN BLDV-MCNC: 28 MG/DL (ref 6–20)
BUN/CREAT BLD: 6 (ref 9–20)
CALCIUM SERPL-MCNC: 8.2 MG/DL (ref 8.6–10.4)
CHLORIDE BLD-SCNC: 99 MMOL/L (ref 98–107)
CO2: 22 MMOL/L (ref 20–31)
CREAT SERPL-MCNC: 4.66 MG/DL (ref 0.7–1.2)
GFR AFRICAN AMERICAN: 16 ML/MIN
GFR NON-AFRICAN AMERICAN: 13 ML/MIN
GFR SERPL CREATININE-BSD FRML MDRD: ABNORMAL ML/MIN/{1.73_M2}
GFR SERPL CREATININE-BSD FRML MDRD: ABNORMAL ML/MIN/{1.73_M2}
GLUCOSE BLD-MCNC: 102 MG/DL (ref 70–99)
HCT VFR BLD CALC: 28.6 % (ref 40.7–50.3)
HEMOGLOBIN: 8.7 G/DL (ref 13–17)
MCH RBC QN AUTO: 31 PG (ref 25.2–33.5)
MCHC RBC AUTO-ENTMCNC: 30.4 G/DL (ref 28.4–34.8)
MCV RBC AUTO: 101.8 FL (ref 82.6–102.9)
NRBC AUTOMATED: 0 PER 100 WBC
PDW BLD-RTO: 18 % (ref 11.8–14.4)
PLATELET # BLD: 162 K/UL (ref 138–453)
PMV BLD AUTO: 9.3 FL (ref 8.1–13.5)
POTASSIUM SERPL-SCNC: 3.5 MMOL/L (ref 3.7–5.3)
RBC # BLD: 2.81 M/UL (ref 4.21–5.77)
SODIUM BLD-SCNC: 136 MMOL/L (ref 135–144)
WBC # BLD: 5.8 K/UL (ref 3.5–11.3)

## 2020-11-13 PROCEDURE — 36415 COLL VENOUS BLD VENIPUNCTURE: CPT

## 2020-11-13 PROCEDURE — 80048 BASIC METABOLIC PNL TOTAL CA: CPT

## 2020-11-13 PROCEDURE — 85027 COMPLETE CBC AUTOMATED: CPT

## 2023-02-15 NOTE — PROGRESS NOTES
Glucose 09/03/2020 117  mg/dL Final    Comment: The ADA and AACC recommend providing the estimated average glucose result to permit better   patient understanding of their HBA1c result.  Cholesterol, Fasting 09/03/2020 156  <200 mg/dL Final    Comment:    Cholesterol Guidelines:      <200  Desirable   200-240  Borderline      >240  Undesirable         HDL 09/03/2020 57  >40 mg/dL Final    Comment:    HDL Guidelines:    <40     Undesirable   40-59    Borderline    >59     Desirable         LDL Cholesterol 09/03/2020 79  0 - 130 mg/dL Final    Comment:    LDL Guidelines:     <100    Desirable   100-129   Near to/above Desirable   130-159   Borderline      >159   Undesirable     Direct (measured) LDL and calculated LDL are not interchangeable tests.  Chol/HDL Ratio 09/03/2020 2.7  <5 Final            Triglyceride, Fasting 09/03/2020 101  <150 mg/dL Final    Comment:    Triglyceride Guidelines:     <150   Desirable   150-199  Borderline   200-499  High     >499   Very high   Based on AHA Guidelines for fasting triglyceride, October 2012.          VLDL 09/03/2020 NOT REPORTED  1 - 30 mg/dL Final    Uric Acid 09/03/2020 4.6  3.4 - 7.0 mg/dL Final    Glucose 09/02/2020 184* 70 - 99 mg/dL Final    BUN 09/02/2020 34* 6 - 20 mg/dL Final    CREATININE 09/02/2020 5.17* 0.70 - 1.20 mg/dL Final    Bun/Cre Ratio 09/02/2020 NOT REPORTED  9 - 20 Final    Calcium 09/02/2020 8.6  8.6 - 10.4 mg/dL Final    Sodium 09/02/2020 134* 135 - 144 mmol/L Final    Potassium 09/02/2020 4.1  3.7 - 5.3 mmol/L Final    Chloride 09/02/2020 90* 98 - 107 mmol/L Final    CO2 09/02/2020 31  20 - 31 mmol/L Final    Anion Gap 09/02/2020 13  9 - 17 mmol/L Final    GFR Non- 09/02/2020 12* >60 mL/min Final    GFR  09/02/2020 14* >60 mL/min Final    GFR Comment 09/02/2020        Final    Comment: Average GFR for 52-63 years old:   80 mL/min/1.73sq m  Chronic Kidney Disease:   <60 mL/min/1.73sq [TextBox_4] : COPD f/u\par \par Overall feeling well; denies any respiratory symptoms at this time \par  m  Kidney failure:   <15 mL/min/1.73sq m              eGFR calculated using average adult body mass.  Additional eGFR calculator available at:        [employer]            GFR Staging 09/02/2020 NOT REPORTED   Final    WBC 09/02/2020 7.2  3.5 - 11.0 k/uL Final    RBC 09/02/2020 3.63* 4.5 - 5.9 m/uL Final    Hemoglobin 09/02/2020 12.2* 13.5 - 17.5 g/dL Final    Hematocrit 09/02/2020 36.7* 41 - 53 % Final    MCV 09/02/2020 101.1* 80 - 100 fL Final    MCH 09/02/2020 33.7  26 - 34 pg Final    MCHC 09/02/2020 33.3  31 - 37 g/dL Final    RDW 09/02/2020 14.3  11.5 - 14.9 % Final    Platelets 61/93/7481 176  150 - 450 k/uL Final    MPV 09/02/2020 7.1  6.0 - 12.0 fL Final    NRBC Automated 09/02/2020 NOT REPORTED  per 100 WBC Final    Differential Type 09/02/2020 NOT REPORTED   Final    Seg Neutrophils 09/02/2020 81* 36 - 66 % Final    Lymphocytes 09/02/2020 10* 24 - 44 % Final    Monocytes 09/02/2020 6  1 - 7 % Final    Eosinophils % 09/02/2020 2  0 - 4 % Final    Basophils 09/02/2020 1  0 - 2 % Final    Immature Granulocytes 09/02/2020 NOT REPORTED  0 % Final    Segs Absolute 09/02/2020 5.90  1.3 - 9.1 k/uL Final    Absolute Lymph # 09/02/2020 0.70* 1.0 - 4.8 k/uL Final    Absolute Mono # 09/02/2020 0.40  0.1 - 1.3 k/uL Final    Absolute Eos # 09/02/2020 0.20  0.0 - 0.4 k/uL Final    Basophils Absolute 09/02/2020 0.00  0.0 - 0.2 k/uL Final    Absolute Immature Granulocyte 09/02/2020 NOT REPORTED  0.00 - 0.30 k/uL Final    WBC Morphology 09/02/2020 NOT REPORTED   Final    RBC Morphology 09/02/2020 NOT REPORTED   Final    Platelet Estimate 71/42/2105 NOT REPORTED   Final    POC Glucose 09/02/2020 73* 75 - 110 mg/dL Final    Phosphorus 09/02/2020 3.9  2.5 - 4.5 mg/dL Final    POC Glucose 09/03/2020 89  75 - 110 mg/dL Final    Glucose 09/04/2020 122* 70 - 99 mg/dL Final    BUN 09/04/2020 60* 6 - 20 mg/dL Final    CREATININE 09/04/2020 7.69* 0.70 - 1.20 mg/dL Final    Bun/Cre Ratio 09/04/2020 NOT REPORTED  9 - 20 Final    Calcium 09/04/2020 9.5  8.6 - 10.4 mg/dL Final    Sodium 09/04/2020 132* 135 - 144 mmol/L Final    Potassium 09/04/2020 5.6* 3.7 - 5.3 mmol/L Final    Chloride 09/04/2020 90* 98 - 107 mmol/L Final    CO2 09/04/2020 26  20 - 31 mmol/L Final    Anion Gap 09/04/2020 16  9 - 17 mmol/L Final    GFR Non- 09/04/2020 7* >60 mL/min Final    GFR  09/04/2020 9* >60 mL/min Final    GFR Comment 09/04/2020        Final    Comment: Average GFR for 52-63 years old:   80 mL/min/1.73sq m  Chronic Kidney Disease:   <60 mL/min/1.73sq m  Kidney failure:   <15 mL/min/1.73sq m              eGFR calculated using average adult body mass.  Additional eGFR calculator available at:        [employer]            GFR Staging 09/04/2020 NOT REPORTED   Final    POC Glucose 09/03/2020 107  75 - 110 mg/dL Final    POC Glucose 09/03/2020 92  75 - 110 mg/dL Final    POC Glucose 09/04/2020 102  75 - 110 mg/dL Final    POC Glucose 09/04/2020 107  75 - 110 mg/dL Final    Glucose 09/05/2020 130* 70 - 99 mg/dL Final    BUN 09/05/2020 22* 6 - 20 mg/dL Final    CREATININE 09/05/2020 4.33* 0.70 - 1.20 mg/dL Final    Bun/Cre Ratio 09/05/2020 NOT REPORTED  9 - 20 Final    Calcium 09/05/2020 9.2  8.6 - 10.4 mg/dL Final    Sodium 09/05/2020 137  135 - 144 mmol/L Final    Potassium 09/05/2020 4.5  3.7 - 5.3 mmol/L Final    Chloride 09/05/2020 97* 98 - 107 mmol/L Final    CO2 09/05/2020 27  20 - 31 mmol/L Final    Anion Gap 09/05/2020 13  9 - 17 mmol/L Final    GFR Non- 09/05/2020 14* >60 mL/min Final    GFR  09/05/2020 17* >60 mL/min Final    GFR Comment 09/05/2020        Final    Comment: Average GFR for 52-63 years old:   80 mL/min/1.73sq m  Chronic Kidney Disease:   <60 mL/min/1.73sq m  Kidney failure:   <15 mL/min/1.73sq m              eGFR calculated using average adult body mass.  Additional eGFR calculator available at:        [employer]            GFR Staging 09/05/2020 NOT REPORTED   Final    Magnesium 09/05/2020 2.2  1.6 - 2.6 mg/dL Final    Phosphorus 09/05/2020 5.2* 2.5 - 4.5 mg/dL Final    POC Glucose 09/04/2020 118* 75 - 110 mg/dL Final    POC Glucose 09/04/2020 203* 75 - 110 mg/dL Final    POC Glucose 09/05/2020 85  75 - 110 mg/dL Final    POC Glucose 09/05/2020 90  75 - 110 mg/dL Final    POC Glucose 09/05/2020 76  75 - 110 mg/dL Final    Magnesium 09/06/2020 2.6  1.6 - 2.6 mg/dL Final    Phosphorus 09/06/2020 5.8* 2.5 - 4.5 mg/dL Final    POC Glucose 09/05/2020 80  75 - 110 mg/dL Final    POC Glucose 09/06/2020 87  75 - 110 mg/dL Final    POC Glucose 09/06/2020 95  75 - 110 mg/dL Final    POC Glucose 09/06/2020 167* 75 - 110 mg/dL Final    Magnesium 09/07/2020 2.4  1.6 - 2.6 mg/dL Final    Phosphorus 09/07/2020 5.8* 2.5 - 4.5 mg/dL Final    Sodium 09/07/2020 138  135 - 144 mmol/L Final    Potassium 09/07/2020 5.4* 3.7 - 5.3 mmol/L Final    Chloride 09/07/2020 95* 98 - 107 mmol/L Final    CO2 09/07/2020 26  20 - 31 mmol/L Final    Anion Gap 09/07/2020 17  9 - 17 mmol/L Final    POC Glucose 09/06/2020 64* 75 - 110 mg/dL Final    POC Glucose 09/06/2020 89  75 - 110 mg/dL Final    POC Glucose 09/07/2020 86  75 - 110 mg/dL Final    POC Glucose 09/07/2020 192* 75 - 110 mg/dL Final    POC Glucose 09/07/2020 97  75 - 110 mg/dL Final            Medications  Current Facility-Administered Medications: lisinopril (PRINIVIL;ZESTRIL) tablet 20 mg, 20 mg, Oral, Daily  lidocaine 4 % external patch 1 patch, 1 patch, Transdermal, Daily  sertraline (ZOLOFT) tablet 25 mg, 25 mg, Oral, Daily  glucose (GLUTOSE) 40 % oral gel 15 g, 15 g, Oral, PRN  dextrose 50 % IV solution, 12.5 g, Intravenous, PRN  glucagon (rDNA) injection 1 mg, 1 mg, Intramuscular, PRN  dextrose 5 % solution, 100 mL/hr, Intravenous, PRN  ipratropium-albuterol (DUONEB) nebulizer solution 1 ampule, 1 ampule, Inhalation, Q4H WA  albuterol (PROVENTIL) nebulizer solution 2.5 mg, 2.5 mg, Nebulization, Q6H PRN  diphenhydrAMINE (BENADRYL) tablet 25 mg, 25 mg, Oral, Q6H PRN  insulin lispro (HUMALOG) injection vial 0-6 Units, 0-6 Units, Subcutaneous, TID WC  insulin lispro (HUMALOG) injection vial 0-3 Units, 0-3 Units, Subcutaneous, Nightly  allopurinol (ZYLOPRIM) tablet 100 mg, 100 mg, Oral, Daily  amLODIPine (NORVASC) tablet 10 mg, 10 mg, Oral, Nightly  calcitRIOL (ROCALTROL) capsule 0.25 mcg, 0.25 mcg, Oral, Once per day on Mon Wed Fri  sevelamer (RENVELA) tablet 4,000 mg, 4,000 mg, Oral, TID WC  Roflumilast (DALIRESP) tablet 500 mcg, 500 mcg, Oral, Daily  tamsulosin (FLOMAX) capsule 0.4 mg, 0.4 mg, Oral, Daily  budesonide-formoterol (SYMBICORT) 160-4.5 MCG/ACT inhaler 2 puff, 2 puff, Inhalation, Q12H  montelukast (SINGULAIR) tablet 10 mg, 10 mg, Oral, Nightly  acetaminophen (TYLENOL) tablet 650 mg, 650 mg, Oral, Q4H PRN  diphenhydrAMINE (BENADRYL) injection 50 mg, 50 mg, Intramuscular, Q4H PRN  haloperidol lactate (HALDOL) injection 5 mg, 5 mg, Intramuscular, Q4H PRN  haloperidol (HALDOL) tablet 5 mg, 5 mg, Oral, Q4H PRN  hydrOXYzine (ATARAX) tablet 50 mg, 50 mg, Oral, TID PRN  LORazepam (ATIVAN) injection 2 mg, 2 mg, Intramuscular, Q4H PRN  polyethylene glycol (GLYCOLAX) packet 17 g, 17 g, Oral, Daily PRN  traZODone (DESYREL) tablet 50 mg, 50 mg, Oral, Nightly PRN  benzocaine-menthol (CEPACOL SORE THROAT) lozenge 1 lozenge, 1 lozenge, Oral, Q2H PRN  LORazepam (ATIVAN) tablet 2 mg, 2 mg, Oral, Q4H PRN    ASSESSMENT  Schizoaffective disorder (HCC)     PLAN  Patient s symptoms   are improving  Will continue current medications  Attempt to develop insight  Psycho-education conducted. Supportive Therapy conducted.   Probable discharge is tomorrow  Follow-up TBD    Electronically signed by Constantine Galvez MD on 9/7/20 at 9:44 PM EDT    **This report has been created using voice recognition software. It may contain minor errors which are inherent in voice recognition technology. **